# Patient Record
Sex: MALE | Race: WHITE | NOT HISPANIC OR LATINO | Employment: UNEMPLOYED
[De-identification: names, ages, dates, MRNs, and addresses within clinical notes are randomized per-mention and may not be internally consistent; named-entity substitution may affect disease eponyms.]

---

## 2020-02-05 ENCOUNTER — TRANSCRIBE ORDERS (OUTPATIENT)
Dept: OTHER | Age: 40
End: 2020-02-05

## 2020-02-05 DIAGNOSIS — G35 MULTIPLE SCLEROSIS (H): Primary | ICD-10-CM

## 2020-02-07 NOTE — TELEPHONE ENCOUNTER
RECORDS RECEIVED FROM: External   Date of Appt: 6/25/20   NOTES (FOR ALL VISITS) STATUS DETAILS   OFFICE NOTE from referring provider Care Everywhere Dr. Jelena Rick at [facility]:  2/7/20 telephone encounters  1/29/20     OFFICE NOTE from other specialist Care Everywhere Ting Bazan NP @ [facility]:  12/11/19    Dr Leija @ [facility]:  11/5/19 5/21/19    Dr Tellez @ [facility]:  9/26/17    Dr Gerardo Del Castillo @ [facility]:  7/18/16  4/7/15  10/22/14    Dr Abigail Gunn @ [facility]:  5/17/16   DISCHARGE SUMMARY from hospital N/A    DISCHARGE REPORT from the ER N/A    OPERATIVE REPORT N/A    MEDICATION LIST Care Everywhere    IMAGING  (FOR ALL VISITS)     EMG N/A    EEG N/A    MRI (HEAD, NECK, SPINE) Received [facility]:  MRI Cervical Spine 11/24/19  MRI Brain 11/24/19  MRI Thoracic Spine 5/21/19  MRI Cervical Spine 5/21/19    [facility]:  MRI Brain 5/14/19    [facility]:  MRI Cervical Spine 4/16/15  MRI Head 4/16/15  MRI Thoracic Spine 4/16/15   LUMBAR PUNCTURE N/A    [facility] Scan N/A    CT (HEAD, NECK, SPINE) Received [facility]:  CT Head 5/3/19      Action 2/7/20 MV 11.14am   Action Taken Imaging request faxed to [facility] ([facility]) for:  MRI Cervical Spine 11/24/19  MRI Brain 11/24/19  MRI Thoracic Spine 5/21/19  MRI Cervical Spine 5/21/19     Action 2/7/20 MV 11.14am   Action Taken Imaging request faxed to [facility] for:  MRI Cervical Spine 4/16/15  MRI Head 4/16/15  MRI Thoracic Spine 4/16/15     Action 2/7/20 MV 11.15am   Action Taken Imaging request faxed to [facility] for:  CT Head 5/3/19     Imaging Received  2/11/20 MV 10.36am  [facility]   Image Type (x): Disc:   PACS: x   Exam Date/Name MRI Cervical Spine 4/16/15  MRI Head 4/16/15  MRI Thoracic Spine 4/16/15  CT Head 5/3/19  MRI Cervical Spine 11/24/19  MRI Brain 11/24/19  MRI Thoracic Spine  5/21/19  MRI Cervical Spine 5/21/19 Comments: images resolved in PACS     Action 2/11/20 MV 10.37am   Action Taken Imaging request faxed to [facility] for:  MRI Brain 5/14/19     Imaging Received  2/20/20 MV 7.30am  [facility]   Image Type (x): Disc:   PACS: x   Exam Date/Name MRI Brain 5/14/19 Comments: images resolved in PACS

## 2020-06-25 ENCOUNTER — PRE VISIT (OUTPATIENT)
Dept: NEUROLOGY | Facility: CLINIC | Age: 40
End: 2020-06-25

## 2020-07-23 ENCOUNTER — VIRTUAL VISIT (OUTPATIENT)
Dept: NEUROLOGY | Facility: CLINIC | Age: 40
End: 2020-07-23
Attending: PSYCHIATRY & NEUROLOGY
Payer: COMMERCIAL

## 2020-07-23 DIAGNOSIS — G35 MULTIPLE SCLEROSIS (H): Primary | ICD-10-CM

## 2020-07-23 RX ORDER — IBUPROFEN 200 MG
200-400 TABLET ORAL EVERY 6 HOURS PRN
COMMUNITY

## 2020-07-23 RX ORDER — DALFAMPRIDINE 10 MG/1
10 TABLET, FILM COATED, EXTENDED RELEASE ORAL 2 TIMES DAILY
Qty: 60 TABLET | Refills: 11 | Status: SHIPPED | OUTPATIENT
Start: 2020-07-23 | End: 2021-06-23

## 2020-07-23 RX ORDER — AMITRIPTYLINE HYDROCHLORIDE 10 MG/1
10 TABLET ORAL DAILY
COMMUNITY
Start: 2020-07-17 | End: 2020-12-23

## 2020-07-23 RX ORDER — BACLOFEN 10 MG/1
TABLET ORAL DAILY
COMMUNITY
Start: 2020-03-13

## 2020-07-23 RX ORDER — AMANTADINE HYDROCHLORIDE 100 MG/1
100 CAPSULE, GELATIN COATED ORAL 2 TIMES DAILY
COMMUNITY
Start: 2019-05-21 | End: 2020-07-23

## 2020-07-23 RX ORDER — MODAFINIL 200 MG/1
TABLET ORAL DAILY
COMMUNITY
Start: 2020-04-24 | End: 2022-11-07

## 2020-07-23 RX ORDER — ALBUTEROL SULFATE 90 UG/1
AEROSOL, METERED RESPIRATORY (INHALATION) EVERY 4 HOURS PRN
COMMUNITY
Start: 2019-10-04

## 2020-07-23 RX ORDER — NORTRIPTYLINE HCL 25 MG
25 CAPSULE ORAL AT BEDTIME
Qty: 30 CAPSULE | Refills: 3 | Status: SHIPPED | OUTPATIENT
Start: 2020-07-23 | End: 2020-12-23

## 2020-07-23 RX ORDER — HYDROCODONE BITARTRATE AND ACETAMINOPHEN 5; 325 MG/1; MG/1
1-2 TABLET ORAL EVERY 8 HOURS PRN
COMMUNITY
Start: 2020-07-20

## 2020-07-23 RX ORDER — DIMETHYL FUMARATE 240 MG/1
240 CAPSULE ORAL 2 TIMES DAILY
COMMUNITY
Start: 2019-05-21 | End: 2020-10-27

## 2020-07-23 NOTE — LETTER
7/23/2020       RE: Jovan Cohen  [address]  [address]  [place] [address]     Dear Colleague,    Thank you for referring your patient, Jovan Cohen, to the [facility] MULTIPLE SCLEROSIS at [facility]. Please see a copy of my visit note below.    Jovan Cohen is a 39 year old male who is being evaluated via a billable video visit.        Video-Visit Details    Type of service:  Video Visit    Video Start Time: 1:35 PM  Video End Time: 3:00PM    Originating Location (pt. Location): Home    Distant Location (provider location):  [facility] MULTIPLE SCLEROSIS     Platform used for Video Visit: Closely    THE [facility] MULTIPLE SCL[facility]OSIS CLINIC  NEW PATIENT EVALUATION/CONSULTATION    Referral source:   [facility] [address] / [place] [address]            PRINCIPAL NEUROLOGIC DIAGNOSIS: Multiple Sclerosis    DISEASE SUMMARY  Date of onset: teenage years  Date of diagnosis of MS: 2002  Disease course at onset: Relapsing Remitting  Current disease course: Relapsing Remitting  Previous disease therapies: Copaxone, Gilenya  Current disease therapy: Tecfidera x 3 years or so  Most recent MRI brain: 2019  Most recent MRI cervical spine: 2019  CSF:         HISTORY OF ILLNESS:    An opinion on this 39 year old right handed genetic male  was requested by Dr. Lydia Rick for second opinion regarding MS and its management. The patient was accompanied by daughter.       He reports to have had a concussion a year ago with no LOC but post concussion fatigue and dizziness. CT scan was normal at that tome, last week he had another concussion and he feels like the first time.    Regarding his MS I asked him how have his disability changed from the time that his daughter was born 7 years ago and now, he is a single father and the caretaker of his daughter  Who is autistic. He remember using a power IPNetVoicer but he actually worked  "out in a machine and he is now able to walk independently but has balance issues and spasticity. He is able to walk throughout the grocery store holding on to the cart and tries to keep it under 10-15 minutes so he is able to walk without needing to rest, if he does more than that his legs get \"shaky\" or he feels like he needs to sit down and if he overdoes it he would not be able to move much the rest of the day. He did try Ampyra and thinks it helped but he lost his neurologist Dr. Del Castillo who took care of him for about 15 years, since diagnosis to 2 years ago. He also saw Dr Bonilla, Dr Agrawal and more recently Dr. Rick who felt he is stable from the MS stand point and not undergoing an exacerbation based on symptoms and MRI stability but thought he may have migraines.    Going back to Ampyra he stopped taking it about 2 years ago which he now realizes correlates with his ambulation worsening since then.     While we were on the interview he got up to open the door for his father who brought him some food and is able to walk slowly but with some fluidity.    It has been difficult for him to find a physician that he feels comfortable with after losing Dr. Del Castillo who moved to [place].    He has had migraines in the past but since the first concussion his headaches have been worse, he also complains of memory problems worse since the first concussion as well suggestive of post concussive syndrome. Since the most recent concussion last week he has had a severe headache and is taking hydrocodone and cannabis. He is somewhat slow to respond. He took hydrocodone this AM an cannabis yesterday. He used to play hockey and had 2 back surgeries so hence chronic back and neck pain.       Current Symptom  1. Worsening ambulation  2. Vision (difficulty focusing), pain with eye motion.  3. Diplopia mild, worse especially after strain (wears prism glasses)  4. Fatigue  5. Photophobia  6. Constant headache        PAST HISTORY:  History " reviewed. No pertinent past medical history.    History reviewed. No pertinent surgical history.           Current Outpatient Prescriptions:  Current Outpatient Medications   Medication     albuterol (PROAIR HFA/PROVENTIL HFA/VENTOLIN HFA) 108 (90 Base) MCG/ACT inhaler     amantadine (SYMMETREL) 100 MG capsule     amitriptyline (ELAVIL) 10 MG tablet     aspirin (ASA) 81 MG EC tablet     baclofen (LIORESAL) 10 MG tablet     cholecalciferol (VITAMIN D3) 125 mcg (5000 units) capsule     dimethyl fumarate 240 MG CPDR     HYDROcodone-acetaminophen (NORCO) 5-325 MG tablet     modafinil (PROVIGIL) 200 MG tablet     ibuprofen (ADVIL/MOTRIN) 200 MG tablet     No current facility-administered medications for this visit.           ALLERGIES       Allergies   Allergen Reactions     Latex Anaphylaxis     Probable agent thought to have caused  The reaction.        Fingolimod Other (See Comments) and Palpitations     Chest pain hx.            Social History    Social History     Socioeconomic History     Marital status:      Spouse name: Not on file     Number of children: Not on file     Years of education: Not on file     Highest education level: Not on file   Occupational History     Not on file   Social Needs     Financial resource strain: Not on file     Food insecurity     Worry: Not on file     Inability: Not on file     Transportation needs     Medical: Not on file     Non-medical: Not on file   Tobacco Use     Smoking status: Not on file   Substance and Sexual Activity     Alcohol use: Not on file     Drug use: Not on file     Sexual activity: Not on file   Lifestyle     Physical activity     Days per week: Not on file     Minutes per session: Not on file     Stress: Not on file   Relationships     Social connections     Talks on phone: Not on file     Gets together: Not on file     Attends Yazdanism service: Not on file     Active member of club or organization: Not on file     Attends meetings of clubs or  organizations: Not on file     Relationship status: Not on file     Intimate partner violence     Fear of current or ex partner: Not on file     Emotionally abused: Not on file     Physically abused: Not on file     Forced sexual activity: Not on file   Other Topics Concern     Not on file   Social History Narrative     Not on file         FAMILY HISTORY     History reviewed. No pertinent family history.      REVIEW OF SYSTEMS:    Comprehensive review of systems otherwise was negative, including constitutional, head and neck, cardiovascular, pulmonary, gastrointestinal, endocrine, urologic, reproductive, rheumatic, hematologic, immunologic, dermatologic, and psychiatric.    Nutritional concerns: None  Driving issues: None   Safety concerns regarding living situations and safety at home: None  Risk of falls: None  Pain: None    REVIEW OF IMAGING STUDIES:    I personally reviewed the following images:        Impression:    RRMS: possible in the SPMS, on Tecfidera, unclear if he is currently having disease activity on MRI since the last one is from 11-19 but clinically I don't feel he is in the  subacute phase of an exacerbation.    MRI brain from 11-19  shows significant volume loss and increase lesion number compared to 2005.    Fatigue and ambulation an issue, worse since he stopped Ampyra.  Cognition an issue, worse since concussion.  Agree he may have chronic Migraine but the post concussive syndrome is playing a role in current symptoms.       Plan:    Aleve 225 mg take 4 pills BID instead of Hydrocodone for headache.  Switch to Nortriptyline 25 mg po at bedtime (less side effects than elavil).  Refer to headache clinic for long term management  Re-start dalfamrpidine 10 hours apart (6 AM and 4 PM if possible or 10 hours apart) to avoid insomnia.  Repeat MRI of the brain B, C and T spine w/wo contrast before next visit in 1 month.  CBC, CMP and Vit d levels  Discharge amantadine (continue Modafinil 200 mg po  every day)  RTC in 1 month face to face visit to discuss DMT switch        Finally I will follow the patient up in 1 month(s) as long as the patient is doing well. I instructed the patient to call or mychart my office with any concerns or questions.    I spent 90 minutes in this visit, with >50% direct patient time spent counseling about prognosis, treatment options, and coordination of care.     My recommendations will be communicated back to the patient's physician(s) by mail.  Follow-up is expected to be with me.      Barbara Spain MD  Chief, Multiple Sclerosis Division  Department of Neurology  [facility] [place]

## 2020-07-23 NOTE — PROGRESS NOTES
"Jovan Cohen is a 39 year old male who is being evaluated via a billable video visit.      The patient has been notified of following:     \"This video visit will be conducted via a call between you and your physician/provider. We have found that certain health care needs can be provided without the need for an in-person physical exam.  This service lets us provide the care you need with a video conversation.  If a prescription is necessary we can send it directly to your pharmacy.  If lab work is needed we can place an order for that and you can then stop by our lab to have the test done at a later time.    Video visits are billed at different rates depending on your insurance coverage.  Please reach out to your insurance provider with any questions.    If during the course of the call the physician/provider feels a video visit is not appropriate, you will not be charged for this service.\"    Patient has given verbal consent for Video visit? Yes  How would you like to obtain your AVS? Mail a copy  If you are dropped from the video visit, the video invite should be resent to: Text to cell phone: [phone]  Will anyone else be joining your video visit? No        Video-Visit Details    Type of service:  Video Visit    Video Start Time: 1:35 PM  Video End Time: 3:00PM    Originating Location (pt. Location): Home    Distant Location (provider location):  [facility] MULTIPLE SCLEROSIS     Platform used for Video Visit: [employer]    THE [facility] MULTIPLE SCLEROSIS CLINIC  NEW PATIENT EVALUATION/CONSULTATION    Referral source:   [facility] [address] / [place] [address]            PRINCIPAL NEUROLOGIC DIAGNOSIS: Multiple Sclerosis    DISEASE SUMMARY  Date of onset: teenage years  Date of diagnosis of MS: 2002  Disease course at onset: Relapsing Remitting  Current disease course: Relapsing Remitting  Previous disease therapies: Copaxone, Gilenya  Current disease therapy: " "Tecfidera x 3 years or so  Most recent MRI brain: 2019  Most recent MRI cervical spine: 2019  CSF:         HISTORY OF ILLNESS:    An opinion on this 39 year old right handed genetic male  was requested by Dr. Lydia Rick for second opinion regarding MS and its management. The patient was accompanied by daughter.       He reports to have had a concussion a year ago with no LOC but post concussion fatigue and dizziness. CT scan was normal at that tome, last week he had another concussion and he feels like the first time.    Regarding his MS I asked him how have his disability changed from the time that his daughter was born 7 years ago and now, he is a single father and the caretaker of his daughter  Who is autistic. He remember using a power whelchair but he actually worked out in a machine and he is now able to walk independently but has balance issues and spasticity. He is able to walk throughout the grocery store holding on to the cart and tries to keep it under 10-15 minutes so he is able to walk without needing to rest, if he does more than that his legs get \"shaky\" or he feels like he needs to sit down and if he overdoes it he would not be able to move much the rest of the day. He did try Ampyra and thinks it helped but he lost his neurologist Dr. Del Castillo who took care of him for about 15 years, since diagnosis to 2 years ago. He also saw Dr Bonilla, Dr Agrawal and more recently Dr. Rick who felt he is stable from the MS stand point and not undergoing an exacerbation based on symptoms and MRI stability but thought he may have migraines.    Going back to Ampyra he stopped taking it about 2 years ago which he now realizes correlates with his ambulation worsening since then.     While we were on the interview he got up to open the door for his father who brought him some food and is able to walk slowly but with some fluidity.    It has been difficult for him to find a physician that he feels comfortable " with after losing Dr. Del Castillo who moved to [place].    He has had migraines in the past but since the first concussion his headaches have been worse, he also complains of memory problems worse since the first concussion as well suggestive of post concussive syndrome. Since the most recent concussion last week he has had a severe headache and is taking hydrocodone and cannabis. He is somewhat slow to respond. He took hydrocodone this AM an cannabis yesterday. He used to play hockey and had 2 back surgeries so hence chronic back and neck pain.       Current Symptom  1. Worsening ambulation  2. Vision (difficulty focusing), pain with eye motion.  3. Diplopia mild, worse especially after strain (wears prism glasses)  4. Fatigue  5. Photophobia  6. Constant headache        PAST HISTORY:  History reviewed. No pertinent past medical history.    History reviewed. No pertinent surgical history.           Current Outpatient Prescriptions:  Current Outpatient Medications   Medication     albuterol (PROAIR HFA/PROVENTIL HFA/VENTOLIN HFA) 108 (90 Base) MCG/ACT inhaler     amantadine (SYMMETREL) 100 MG capsule     amitriptyline (ELAVIL) 10 MG tablet     aspirin (ASA) 81 MG EC tablet     baclofen (LIORESAL) 10 MG tablet     cholecalciferol (VITAMIN D3) 125 mcg (5000 units) capsule     dimethyl fumarate 240 MG CPDR     HYDROcodone-acetaminophen (NORCO) 5-325 MG tablet     modafinil (PROVIGIL) 200 MG tablet     ibuprofen (ADVIL/MOTRIN) 200 MG tablet     No current facility-administered medications for this visit.           ALLERGIES       Allergies   Allergen Reactions     Latex Anaphylaxis     Probable agent thought to have caused  The reaction.        Fingolimod Other (See Comments) and Palpitations     Chest pain hx.            Social History    Social History     Socioeconomic History     Marital status:      Spouse name: Not on file     Number of children: Not on file     Years of education: Not on file     Highest  education level: Not on file   Occupational History     Not on file   Social Needs     Financial resource strain: Not on file     Food insecurity     Worry: Not on file     Inability: Not on file     Transportation needs     Medical: Not on file     Non-medical: Not on file   Tobacco Use     Smoking status: Not on file   Substance and Sexual Activity     Alcohol use: Not on file     Drug use: Not on file     Sexual activity: Not on file   Lifestyle     Physical activity     Days per week: Not on file     Minutes per session: Not on file     Stress: Not on file   Relationships     Social connections     Talks on phone: Not on file     Gets together: Not on file     Attends [employer] service: Not on file     Active member of club or organization: Not on file     Attends meetings of clubs or organizations: Not on file     Relationship status: Not on file     Intimate partner violence     Fear of current or ex partner: Not on file     Emotionally abused: Not on file     Physically abused: Not on file     Forced sexual activity: Not on file   Other Topics Concern     Not on file   Social History Narrative     Not on file         FAMILY HISTORY     History reviewed. No pertinent family history.      REVIEW OF SYSTEMS:    Comprehensive review of systems otherwise was negative, including constitutional, head and neck, cardiovascular, pulmonary, gastrointestinal, endocrine, urologic, reproductive, rheumatic, hematologic, immunologic, dermatologic, and psychiatric.    Nutritional concerns: None  Driving issues: None   Safety concerns regarding living situations and safety at home: None  Risk of falls: None  Pain: None    REVIEW OF IMAGING STUDIES:    I personally reviewed the following images:        Impression:    RRMS: possible in the SPMS, on Tecfidera, unclear if he is currently having disease activity on MRI since the last one is from 11-19 but clinically I don't feel he is in the  subacute phase of an  exacerbation.    MRI brain from 11-19  shows significant volume loss and increase lesion number compared to 2005.    Fatigue and ambulation an issue, worse since he stopped Ampyra.  Cognition an issue, worse since concussion.  Agree he may have chronic Migraine but the post concussive syndrome is playing a role in current symptoms.       Plan:    Aleve 225 mg take 4 pills BID instead of Hydrocodone for headache.  Switch to Nortriptyline 25 mg po at bedtime (less side effects than elavil).  Refer to headache clinic for long term management  Re-start dalfamrpidine 10 hours apart (6 AM and 4 PM if possible or 10 hours apart) to avoid insomnia.  Repeat MRI of the brain B, C and T spine w/wo contrast before next visit in 1 month.  CBC, CMP and Vit d levels  Discharge amantadine (continue Modafinil 200 mg po every day)  RTC in 1 month face to face visit to discuss DMT switch        Finally I will follow the patient up in 1 month(s) as long as the patient is doing well. I instructed the patient to call or mychart my office with any concerns or questions.    I spent 90 minutes in this visit, with >50% direct patient time spent counseling about prognosis, treatment options, and coordination of care.     My recommendations will be communicated back to the patient's physician(s) by mail.  Follow-up is expected to be with me.      Barbara Spain MD  Chief, Multiple Sclerosis Division  Department of Neurology  [facility] [place]

## 2020-07-23 NOTE — PATIENT INSTRUCTIONS
Impression:    RRMS: possible in the SPMS, on Tecfidera, unclear if he is currently having disease activity on MRI since the last one is from 11-19 but clinically I don't feel he is in the  subacute phase of an exacerbation.    MRI brain from 11-19  shows significant volume loss and increase lesion number compared to 2005.    Fatigue and ambulation an issue, worse since he stopped Ampyra.  Cognition an issue, worse since concussion.  Agree he may have chronic Migraine but the post concussive syndrome is playing a role in current symptoms.       Plan:  Aleve 225 mg take 4 pills BID instead of Hydrocodone for headache.  Switch to Nortriptyline 25 mg po at bedtime (less side effects than elavil).  Refer to headache clinic for long term management  Re-start dalfamrpidine 10 hours apart (6 AM and 4 PM if possible) to avoid insomnia.  Repeat MRI of the brain B, C and T spine w/wo contrast before next visit in 1 month.  CBC, CMP and Vit d levels  discharge amantadine (continue Modafinil 200 mg po every day  RTC in 1 month face to face visit to discuss DMT switch

## 2020-07-24 ENCOUNTER — TELEPHONE (OUTPATIENT)
Dept: NEUROLOGY | Facility: CLINIC | Age: 40
End: 2020-07-24

## 2020-07-24 NOTE — TELEPHONE ENCOUNTER
Prior Authorization Approval    Authorization Effective Date: 6/24/2020  Authorization Expiration Date: 7/24/2021  Medication: Dalfampridine ER 10MG Tablets (APPROVED)  Approved Dose/Quantity: 60/30 days  Reference #:     Insurance Company: [employer] - Phone [phone] Fax [phone]  Expected CoPay:       CoPay Card Available:      Foundation Assistance Needed:    Which Pharmacy is filling the prescription (Not needed for infusion/clinic administered): [facility] - [address] [place] [address]  Pharmacy Notified: Yes  Patient Notified: Yes

## 2020-10-15 ENCOUNTER — TELEPHONE (OUTPATIENT)
Dept: NEUROLOGY | Facility: CLINIC | Age: 40
End: 2020-10-15

## 2020-10-16 ENCOUNTER — TELEPHONE (OUTPATIENT)
Dept: NEUROLOGY | Facility: CLINIC | Age: 40
End: 2020-10-16

## 2020-10-16 NOTE — TELEPHONE ENCOUNTER
[facility] Call Center    Phone Message    May a detailed message be left on voicemail: yes     Reason for Call: Other: Patient calling in regards to MRI orders that were sent to [facility]. Pt states that they need another form from Dr. Spain in order to get MRI done. Pt states they faxed form back over to clinic.     Pt states he really wants to get this MRI done before appt with Dr. Spain on 10/21 and is hoping that this form can be filled out and faxed over there asap.    Please advise and call patient back to discuss further     Action Taken: Other:  MS    Travel Screening: Not Applicable

## 2020-10-16 NOTE — TELEPHONE ENCOUNTER
[facility] Call Center    Phone Message    May a detailed message be left on voicemail: yes     Reason for Call: Order(s): Other:   Reason for requested: MRI  Date needed: ASAP  Provider name: Dr. Grabiel Aldana calling to request his MRI be sent to [facility] in [place]. Please call Jovan to confirm.    Action Taken: Message routed to:  [facility]):  MS    Travel Screening: Not Applicable

## 2020-10-16 NOTE — TELEPHONE ENCOUNTER
Spoke with Jovan.  Told him MRI orders have been faxed to [facility] [place] (phone [phone], fax [phone]).  He plans to call and schedule today (10/16).      Pinky Stack RN

## 2020-10-19 NOTE — TELEPHONE ENCOUNTER
Spoke to Jovan and told him the MRI orders have been received.  Orders placed in Dr. Spain's folder for signature.  Jovan still plans to be at his appointment this Wednesday.    Pinky Stack RN

## 2020-10-20 NOTE — TELEPHONE ENCOUNTER
[facility]    Phone Message    May a detailed message be left on voicemail: yes     Reason for Call: Other: Jovan calling back. He states [facility] have not received MRI orders. Pt is upset. Please fax MRI orders as soon as possible to [phone].   Call pt when orders have been faxed.     Action Taken: Message routed to:  [facility] ([facility]):  neuro    Travel Screening: Not Applicable

## 2020-10-21 ENCOUNTER — MEDICAL CORRESPONDENCE (OUTPATIENT)
Dept: HEALTH INFORMATION MANAGEMENT | Facility: CLINIC | Age: 40
End: 2020-10-21

## 2020-10-21 ENCOUNTER — OFFICE VISIT (OUTPATIENT)
Dept: NEUROLOGY | Facility: CLINIC | Age: 40
End: 2020-10-21
Attending: PSYCHIATRY & NEUROLOGY
Payer: COMMERCIAL

## 2020-10-21 VITALS — DIASTOLIC BLOOD PRESSURE: 87 MMHG | HEART RATE: 82 BPM | SYSTOLIC BLOOD PRESSURE: 121 MMHG | WEIGHT: 178.2 LBS

## 2020-10-21 DIAGNOSIS — G35 MULTIPLE SCLEROSIS (H): Primary | ICD-10-CM

## 2020-10-21 PROCEDURE — G0463 HOSPITAL OUTPT CLINIC VISIT: HCPCS

## 2020-10-21 PROCEDURE — 99214 OFFICE O/P EST MOD 30 MIN: CPT | Mod: GC | Performed by: PSYCHIATRY & NEUROLOGY

## 2020-10-21 SDOH — HEALTH STABILITY: MENTAL HEALTH: HOW OFTEN DO YOU HAVE A DRINK CONTAINING ALCOHOL?: NOT ASKED

## 2020-10-21 SDOH — HEALTH STABILITY: MENTAL HEALTH: HOW OFTEN DO YOU HAVE 6 OR MORE DRINKS ON ONE OCCASION?: NOT ASKED

## 2020-10-21 SDOH — HEALTH STABILITY: MENTAL HEALTH: HOW MANY STANDARD DRINKS CONTAINING ALCOHOL DO YOU HAVE ON A TYPICAL DAY?: NOT ASKED

## 2020-10-21 ASSESSMENT — PAIN SCALES - GENERAL: PAINLEVEL: NO PAIN (0)

## 2020-10-21 NOTE — LETTER
10/21/2020       RE: Jovan Cohen  [address]  [address]  [facility] [place] [address]     Dear Colleague,    Thank you for referring your patient, Jovan Cohen, to the [facility] [place] at [facility]. Please see a copy of my visit note below.    [facility], [place]   Neurology Clinic Follow up Note  Jovan Cohen  [id number]  10/21/2020    HPI:   Last time he was seen as a virtual visit 7/23/202.    The recommendation from the last visit was as the followings   Aleve 225 mg take 4 pills BID instead of Hydrocodone for headache.  Switch to Nortriptyline 25 mg po at bedtime (less side effects than elavil).  Refer to headache clinic for long term management  Re-start dalfamrpidine 10 hours apart (6 AM and 4 PM if possible or 10 hours apart) to avoid insomnia.  Repeat MRI of the brain B, C and T spine w/wo contrast before next visit in 1 month.  CBC, CMP and Vit d levels  Discharge amantadine (continue Modafinil 200 mg po every day)  RTC in 1 month face to face visit to discuss DMT switch    Brief H&P  40 year old male patient with history of brain concussion, he was diagnosed 15 years ago with MS, in general he got really worse over years, he did not have any relapse for at least 5 years. Since then he continued to have unsteady walking and moving his eyes has been difficult and eye fatigue and eye pain and sort of double vision.     First relapse he had may be when he was 18 year old he said, he got what he described horrible pain in the finger tips and pain in the eyes that was increasing with movement and legs muscle cramps, that lasted for years (after that his symptoms continued to fluctuate). In 2002, he thought he had another relapse c/w vertical nystagmus and vertigo  And left side paralysis, the eye symptoms subsided gradually over couple years then finally he was diagnosed with MS around 2004. Started  on copaxone for 7 years, then other oral med that did not work (could not remember the name), then started tycfidera until a month ago because he ran out the prescription.     Interval History   He did not get the MRI, they need a special form signed by his doctor before they can schedule the MRI he said he was told. He did not see headache doctor (he did not set up his appointment), he gets that constant eye pain, he takes the amitriptyline , may be it does help but it causes dry mouth he said. He takes norco for chronic lumbar back pain. He is not taking aleve. When he started the dalfampridine, he think he can walk little better. The cannabis helps the urinary incontinence he said. He still gets muscle stiffness and fatigue, he takes baclofen for the muscle spasms which helps.       ROS:  A 10-point ROS was performed, negative except as per HPI.    PMH:  No past medical history on file.    PSH:  No past surgical history on file.    Allergies:  Allergies   Allergen Reactions     Latex Anaphylaxis     Probable agent thought to have caused  The reaction.        Fingolimod Other (See Comments) and Palpitations     Chest pain hx.          Medications:    Current Outpatient Medications:      albuterol (PROAIR HFA/PROVENTIL HFA/VENTOLIN HFA) 108 (90 Base) MCG/ACT inhaler, every 4 hours as needed, Disp: , Rfl:      amitriptyline (ELAVIL) 10 MG tablet, Take 10 mg by mouth daily, Disp: , Rfl:      aspirin (ASA) 81 MG EC tablet, Take 81 mg by mouth daily, Disp: , Rfl:      baclofen (LIORESAL) 10 MG tablet, daily, Disp: , Rfl:      cholecalciferol (VITAMIN D3) 125 mcg (5000 units) capsule, Take 5,000 Units by mouth every 3 days, Disp: , Rfl:      Dalfampridine 10 MG TB12, Take 1 tablet (10 mg) by mouth 2 times daily, Disp: 60 tablet, Rfl: 11     dimethyl fumarate 240 MG CPDR, Take 240 mg by mouth 2 times daily, Disp: , Rfl:      HYDROcodone-acetaminophen (NORCO) 5-325 MG tablet, Take 1-2 tablets by mouth every 8 hours as  needed, Disp: , Rfl:      ibuprofen (ADVIL/MOTRIN) 200 MG tablet, Take 200-400 mg by mouth every 6 hours as needed, Disp: , Rfl:      modafinil (PROVIGIL) 200 MG tablet, daily, Disp: , Rfl:      nortriptyline (PAMELOR) 25 MG capsule, Take 1 capsule (25 mg) by mouth At Bedtime, Disp: 30 capsule, Rfl: 3    Social History:  Social History     Tobacco Use     Smoking status: Not on file   Substance Use Topics     Alcohol use: Not on file       Family History:  No family history on file.      Objective   There were no vitals taken for this visit.  General: pt laying comfortably in bed, breathing easily on ra, in NAD   HEENT: no oral ulcers   Chest: cta   Heart: rrr  Abdomen: soft, nt, nd, +BS  Ext: no edema   Skin: no rashes  Neurological examination:  Mental status:  Patient is alert, attentive, and oriented x 3.  Language is coherent and fluent without dysarthria or aphasia.  Memory, comprehension and ability to follow commands were intact.        Cranial nerves: Pupils were round and reacted to light.  Extraocular movements were limited because of eye pain w movement. There was no face, jaw, palate or tongue weakness or atrophy. Hearing was grossly intact. Shrugging shoulder is normal    Motor exam: No atrophy or fasciculations.  No action or percussion myotonia or paramyotonia.  Manual muscle testing revealed the following MRC grade muscle power:    Right Left   Neck flexion 5     Neck extension 5     Shoulder ext rotation 5  5    Shoulder abduction 5 5   Elbow extension 5 5   Elbow flexion             5 5   Wrist extension 5 5   Wrist flexion 5 5   Finger extension 5 5   Finger flexion 5 5   FDI 5 5   APB 5 5   Hip extension 5 5    Hip flexion 5- 5 -   Knee extension 5 5   Knee flexion 5 5   Dorsiflexion 5 5   Plantarflexion 5 5   Eversion 5 5   Inversion 5 5   Ext Hallucis Longus 5 5      Complex motor skills revealed normal coordination.  Finger-nose- finger and heel to shin were intact.       Sensory exam  revealed vibration to be  >10/>10s at the toes, >20>20s at the fingers. PP decreased on the left hand   Pt was not able to walk on heels or toes and was able to do tandem without any difficulty.       Deep tendon reflexes:    Right Left   Triceps 2 2   Biceps 2 2   Brachioradialis 2 2   Knee jerk 3 3   Ankle jerk 3 3   Plantar responses were flexor bilaterally. 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     RRMS   Possible in the SPMS, on Tecfidera but he stopped it one month ago, unclear if he is currently having disease activity on MRI since the last one is from 11/2019 but clinically I don't feel he is in the  subacute phase of an exacerbation. MRI brain from 11-19  shows significant volume loss and increase lesion number compared to 2005.     Fatigue and ambulation an issue  Worse since he stopped Ampyra.    Cognition an issue and possible postconcussion syndrome and headache   worse since concussion. Amitriptyline was prescribed during his original visit in July and helped with headaches but its causing dry mouth so we will taper off.    Muscle spasms   He thinks baclofen and cannabis help      Brain Fogginess  He think modafinil helps     Eye pain and spasms during movement, no vision loss  We will refer to neurophthalmology for further evaluation     Recommendations:   1. Repeat MRI of the brain cervical and thoracic spine w/wo contrast at [facility], the day of next visit.  2. Re-start Tecfidera  3. Refer to neurophthalmology   4. Decrease Elavil to 5 mg po at bedtime x 1 month then stop  5. Continue Ampyra  6. Bring father to next appointment if possible  Patient was seen and discussed with attending neurologist, Dr. Grabiel Jacome MD  Neurology G4  [phone]    I saw and evaluated the patient with the resident and agree with the assessment and plan. I  was present for key portions of the above examination.    Barbara Spain MD  Chief, Multiple Sclerosis Division  Department of Neurology  [facility]      Again, thank you for allowing me to participate in the care of your patient.      Sincerely,    Barbara Spain MD

## 2020-10-21 NOTE — LETTER
10/21/2020       RE: Jovan Cohen  [address]  [address]  [place] [place] [facility] [address]     Dear Colleague,    Thank you for referring your patient, Jovan Cohen, to the [facility] [place] at [facility]. Please see a copy of my visit note below.    [facility], [place]   Neurology Clinic Follow up Note  Jovan Cohen  [id number]  10/21/2020    HPI:   Last time he was seen as a virtual visit 7/23/202.    The recommendation from the last visit was as the followings   Aleve 225 mg take 4 pills BID instead of Hydrocodone for headache.  Switch to Nortriptyline 25 mg po at bedtime (less side effects than elavil).  Refer to headache clinic for long term management  Re-start dalfamrpidine 10 hours apart (6 AM and 4 PM if possible or 10 hours apart) to avoid insomnia.  Repeat MRI of the brain B, C and T spine w/wo contrast before next visit in 1 month.  CBC, CMP and Vit d levels  Discharge amantadine (continue Modafinil 200 mg po every day)  RTC in 1 month face to face visit to discuss DMT switch    Brief H&P  40 year old male patient with history of brain concussion, he was diagnosed 15 years ago with MS, in general he got really worse over years, he did not have any relapse for at least 5 years. Since then he continued to have unsteady walking and moving his eyes has been difficult and eye fatigue and eye pain and sort of double vision.     First relapse he had may be when he was 18 year old he said, he got what he described horrible pain in the finger tips and pain in the eyes that was increasing with movement and legs muscle cramps, that lasted for years (after that his symptoms continued to fluctuate). In 2002, he thought he had another relapse c/w vertical nystagmus and vertigo  And left side paralysis, the eye symptoms subsided gradually over couple years then finally he was diagnosed with MS around 2004. Started  on copaxone for 7 years, then other oral med that did not work (could not remember the name), then started tycfidera until a month ago because he ran out the prescription.     Interval History   He did not get the MRI, they need a special form signed by his doctor before they can schedule the MRI he said he was told. He did not see headache doctor (he did not set up his appointment), he gets that constant eye pain, he takes the amitriptyline , may be it does help but it causes dry mouth he said. He takes norco for chronic lumbar back pain. He is not taking aleve. When he started the dalfampridine, he think he can walk little better. The cannabis helps the urinary incontinence he said. He still gets muscle stiffness and fatigue, he takes baclofen for the muscle spasms which helps.     ROS:  A 10-point ROS was performed, negative except as per HPI.    PMH:  No past medical history on file.    PSH:  No past surgical history on file.    Allergies:  Allergies   Allergen Reactions     Latex Anaphylaxis     Probable agent thought to have caused  The reaction.        Fingolimod Other (See Comments) and Palpitations     Chest pain hx.        Medications:  Current Outpatient Medications:      albuterol (PROAIR HFA/PROVENTIL HFA/VENTOLIN HFA) 108 (90 Base) MCG/ACT inhaler, every 4 hours as needed, Disp: , Rfl:      amitriptyline (ELAVIL) 10 MG tablet, Take 10 mg by mouth daily, Disp: , Rfl:      aspirin (ASA) 81 MG EC tablet, Take 81 mg by mouth daily, Disp: , Rfl:      baclofen (LIORESAL) 10 MG tablet, daily, Disp: , Rfl:      cholecalciferol (VITAMIN D3) 125 mcg (5000 units) capsule, Take 5,000 Units by mouth every 3 days, Disp: , Rfl:      Dalfampridine 10 MG TB12, Take 1 tablet (10 mg) by mouth 2 times daily, Disp: 60 tablet, Rfl: 11     dimethyl fumarate 240 MG CPDR, Take 240 mg by mouth 2 times daily, Disp: , Rfl:      HYDROcodone-acetaminophen (NORCO) 5-325 MG tablet, Take 1-2 tablets by mouth every 8 hours as needed,  Disp: , Rfl:      ibuprofen (ADVIL/MOTRIN) 200 MG tablet, Take 200-400 mg by mouth every 6 hours as needed, Disp: , Rfl:      modafinil (PROVIGIL) 200 MG tablet, daily, Disp: , Rfl:      nortriptyline (PAMELOR) 25 MG capsule, Take 1 capsule (25 mg) by mouth At Bedtime, Disp: 30 capsule, Rfl: 3      Social History:  Social History     Tobacco Use     Smoking status: Not on file   Substance Use Topics     Alcohol use: Not on file     Family History:  No family history on file.    Objective   There were no vitals taken for this visit.  General: pt laying comfortably in bed, breathing easily on ra, in NAD   HEENT: no oral ulcers   Chest: cta   Heart: rrr  Abdomen: soft, nt, nd, +BS  Ext: no edema   Skin: no rashes  Neurological examination:  Mental status:  Patient is alert, attentive, and oriented x 3.  Language is coherent and fluent without dysarthria or aphasia.  Memory, comprehension and ability to follow commands were intact.        Cranial nerves: Pupils were round and reacted to light.  Extraocular movements were limited because of eye pain w movement. There was no face, jaw, palate or tongue weakness or atrophy. Hearing was grossly intact. Shrugging shoulder is normal    Motor exam: No atrophy or fasciculations.  No action or percussion myotonia or paramyotonia.  Manual muscle testing revealed the following MRC grade muscle power:    Right Left   Neck flexion 5     Neck extension 5     Shoulder ext rotation 5  5    Shoulder abduction 5 5   Elbow extension 5 5   Elbow flexion             5 5   Wrist extension 5 5   Wrist flexion 5 5   Finger extension 5 5   Finger flexion 5 5   FDI 5 5   APB 5 5   Hip extension 5 5    Hip flexion 5- 5 -   Knee extension 5 5   Knee flexion 5 5   Dorsiflexion 5 5   Plantarflexion 5 5   Eversion 5 5   Inversion 5 5   Ext Hallucis Longus 5 5      Complex motor skills revealed normal coordination.  Finger-nose- finger and heel to shin were intact.       Sensory exam revealed  vibration to be  >10/>10s at the toes, >20>20s at the fingers. PP decreased on the left hand   Pt was not able to walk on heels or toes and was able to do tandem without any difficulty.       Deep tendon reflexes:    Right Left   Triceps 2 2   Biceps 2 2   Brachioradialis 2 2   Knee jerk 3 3   Ankle jerk 3 3   Plantar responses were flexor bilaterally. 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     RRMS   Possible in the SPMS, on Tecfidera but he stopped it one month ago, unclear if he is currently having disease activity on MRI since the last one is from 11/2019 but clinically I don't feel he is in the  subacute phase of an exacerbation. MRI brain from 11-19  shows significant volume loss and increase lesion number compared to 2005.     Fatigue and ambulation an issue  Worse since he stopped Ampyra.    Cognition an issue and possible postconcussion syndrome and headache   worse since concussion. Amitriptyline was prescribed during his original visit in July and helped with headaches but its causing dry mouth so we will taper off.    Muscle spasms   He thinks baclofen and cannabis help      Brain Fogginess  He think modafinil helps     Eye pain and spasms during movement, no vision loss  We will refer to neurophthalmology for further evaluation     Recommendations:   1. Repeat MRI of the brain cervical and thoracic spine w/wo contrast at [facility] [place], the day of next visit.  2. Re-start Tecfidera  3. Refer to neurophthalmology   4. Decrease Elavil to 5 mg po at bedtime x 1 month then stop  5. Continue Ampyra  6. Bring father to next appointment if possible  Patient was seen and discussed with attending neurologist, Dr. Grabiel Jacome MD  Neurology G4  [phone]    I saw and evaluated the patient with the resident and agree with the assessment and plan. I was present  for key portions of the above examination.    Again, thank you for allowing me to participate in the care of your patient.  Sincerely,    Barbara Spain MD  Chief, Multiple Sclerosis Division  Department of Neurology  [facility] [place]

## 2020-10-21 NOTE — PATIENT INSTRUCTIONS
Impression:    1 MS clinically stable on Tecfidera but ran out of it a month ago.    2 Amitriptyline was prescribed during his original visit in July and helped with headaches but its causing dry mouth so we will taper off.    3 He continues to have pain during eye movement, chronic and without vision loss.    4 He feels like Ampyra helped with MS symptoms of imbalance and helped his ambulation     Plan:    1 Re-start Tecfidera. Repeat MRI of the brain cervical and thoracic spine w/wo contrast at [facility] [place], the day of next visit.    2 Refer to neurophthalmology     3 Decrease Elavil to 5 mg po at bedtime x 1 month then stop.    4 Continue Ampyra

## 2020-10-21 NOTE — PROGRESS NOTES
[facility], [place]   Neurology Clinic Follow up Note  Jovan Cohen  [id number]  10/21/2020    HPI:   Last time he was seen as a virtual visit 7/23/202.    The recommendation from the last visit was as the followings   Aleve 225 mg take 4 pills BID instead of Hydrocodone for headache.  Switch to Nortriptyline 25 mg po at bedtime (less side effects than elavil).  Refer to headache clinic for long term management  Re-start dalfamrpidine 10 hours apart (6 AM and 4 PM if possible or 10 hours apart) to avoid insomnia.  Repeat MRI of the brain B, C and T spine w/wo contrast before next visit in 1 month.  CBC, CMP and Vit d levels  Discharge amantadine (continue Modafinil 200 mg po every day)  RTC in 1 month face to face visit to discuss DMT switch    Brief H&P  40 year old male patient with history of brain concussion, he was diagnosed 15 years ago with MS, in general he got really worse over years, he did not have any relapse for at least 5 years. Since then he continued to have unsteady walking and moving his eyes has been difficult and eye fatigue and eye pain and sort of double vision.     First relapse he had may be when he was 18 year old he said, he got what he described horrible pain in the finger tips and pain in the eyes that was increasing with movement and legs muscle cramps, that lasted for years (after that his symptoms continued to fluctuate). In 2002, he thought he had another relapse c/w vertical nystagmus and vertigo  And left side paralysis, the eye symptoms subsided gradually over couple years then finally he was diagnosed with MS around 2004. Started on copaxone for 7 years, then other oral med that did not work (could not remember the name), then started tycfidera until a month ago because he ran out the prescription.     Interval History   He did not get the MRI, they need a special form signed by his doctor before they can schedule the MRI he said he was told. He  did not see headache doctor (he did not set up his appointment), he gets that constant eye pain, he takes the amitriptyline , may be it does help but it causes dry mouth he said. He takes norco for chronic lumbar back pain. He is not taking aleve. When he started the dalfampridine, he think he can walk little better. The cannabis helps the urinary incontinence he said. He still gets muscle stiffness and fatigue, he takes baclofen for the muscle spasms which helps.       ROS:  A 10-point ROS was performed, negative except as per HPI.    PMH:  No past medical history on file.    PSH:  No past surgical history on file.    Allergies:  Allergies   Allergen Reactions     Latex Anaphylaxis     Probable agent thought to have caused  The reaction.        Fingolimod Other (See Comments) and Palpitations     Chest pain hx.          Medications:    Current Outpatient Medications:      albuterol (PROAIR HFA/PROVENTIL HFA/VENTOLIN HFA) 108 (90 Base) MCG/ACT inhaler, every 4 hours as needed, Disp: , Rfl:      amitriptyline (ELAVIL) 10 MG tablet, Take 10 mg by mouth daily, Disp: , Rfl:      aspirin (ASA) 81 MG EC tablet, Take 81 mg by mouth daily, Disp: , Rfl:      baclofen (LIORESAL) 10 MG tablet, daily, Disp: , Rfl:      cholecalciferol (VITAMIN D3) 125 mcg (5000 units) capsule, Take 5,000 Units by mouth every 3 days, Disp: , Rfl:      Dalfampridine 10 MG TB12, Take 1 tablet (10 mg) by mouth 2 times daily, Disp: 60 tablet, Rfl: 11     dimethyl fumarate 240 MG CPDR, Take 240 mg by mouth 2 times daily, Disp: , Rfl:      HYDROcodone-acetaminophen (NORCO) 5-325 MG tablet, Take 1-2 tablets by mouth every 8 hours as needed, Disp: , Rfl:      ibuprofen (ADVIL/MOTRIN) 200 MG tablet, Take 200-400 mg by mouth every 6 hours as needed, Disp: , Rfl:      modafinil (PROVIGIL) 200 MG tablet, daily, Disp: , Rfl:      nortriptyline (PAMELOR) 25 MG capsule, Take 1 capsule (25 mg) by mouth At Bedtime, Disp: 30 capsule, Rfl: 3    Social  History:  Social History     Tobacco Use     Smoking status: Not on file   Substance Use Topics     Alcohol use: Not on file       Family History:  No family history on file.      Objective   There were no vitals taken for this visit.  General: pt laying comfortably in bed, breathing easily on ra, in NAD   HEENT: no oral ulcers   Chest: cta   Heart: rrr  Abdomen: soft, nt, nd, +BS  Ext: no edema   Skin: no rashes  Neurological examination:  Mental status:  Patient is alert, attentive, and oriented x 3.  Language is coherent and fluent without dysarthria or aphasia.  Memory, comprehension and ability to follow commands were intact.        Cranial nerves: Pupils were round and reacted to light.  Extraocular movements were limited because of eye pain w movement. There was no face, jaw, palate or tongue weakness or atrophy. Hearing was grossly intact. Shrugging shoulder is normal    Motor exam: No atrophy or fasciculations.  No action or percussion myotonia or paramyotonia.  Manual muscle testing revealed the following MRC grade muscle power:    Right Left   Neck flexion 5     Neck extension 5     Shoulder ext rotation 5  5    Shoulder abduction 5 5   Elbow extension 5 5   Elbow flexion             5 5   Wrist extension 5 5   Wrist flexion 5 5   Finger extension 5 5   Finger flexion 5 5   FDI 5 5   APB 5 5   Hip extension 5 5    Hip flexion 5- 5 -   Knee extension 5 5   Knee flexion 5 5   Dorsiflexion 5 5   Plantarflexion 5 5   Eversion 5 5   Inversion 5 5   Ext Hallucis Longus 5 5      Complex motor skills revealed normal coordination.  Finger-nose- finger and heel to shin were intact.       Sensory exam revealed vibration to be  >10/>10s at the toes, >20>20s at the fingers. PP decreased on the left hand   Pt was not able to walk on heels or toes and was able to do tandem without any difficulty.       Deep tendon reflexes:    Right Left   Triceps 2 2   Biceps 2 2   Brachioradialis 2 2   Knee jerk 3 3   Ankle jerk 3 3    Plantar responses were flexor bilaterally. 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     RRMS   Possible in the SPMS, on Tecfidera but he stopped it one month ago, unclear if he is currently having disease activity on MRI since the last one is from 11/2019 but clinically I don't feel he is in the  subacute phase of an exacerbation. MRI brain from 11-19  shows significant volume loss and increase lesion number compared to 2005.     Fatigue and ambulation an issue  Worse since he stopped Ampyra.    Cognition an issue and possible postconcussion syndrome and headache   worse since concussion. Amitriptyline was prescribed during his original visit in July and helped with headaches but its causing dry mouth so we will taper off.    Muscle spasms   He thinks baclofen and cannabis help      Brain Fogginess  He think modafinil helps     Eye pain and spasms during movement, no vision loss  We will refer to neurophthalmology for further evaluation     Recommendations:   1. Repeat MRI of the brain cervical and thoracic spine w/wo contrast at [facility], the day of next visit.  2. Re-start Tecfidera  3. Refer to neurophthalmology   4. Decrease Elavil to 5 mg po at bedtime x 1 month then stop  5. Continue Ampyra  6. Bring father to next appointment if possible  Patient was seen and discussed with attending neurologist, Dr. rGabiel Jacome MD  Neurology G4  [phone]    I saw and evaluated the patient with the resident and agree with the assessment and plan. I was present for key portions of the above examination.    Barbara Spain MD  Chief, Multiple Sclerosis Division  Department of Neurology  [facility] [place]

## 2020-10-22 ENCOUNTER — TELEPHONE (OUTPATIENT)
Dept: NEUROLOGY | Facility: CLINIC | Age: 40
End: 2020-10-22

## 2020-10-22 DIAGNOSIS — G35 MULTIPLE SCLEROSIS (H): Primary | ICD-10-CM

## 2020-10-22 NOTE — TELEPHONE ENCOUNTER
Prior Authorization Approval    Authorization Effective Date: 10/22/2020  Authorization Expiration Date: 2/20/2021  Medication: Tecfidera 240MG Capsules (PA on File)  Approved Dose/Quantity: 60  Reference #:     Insurance Company: [employer] - Phone [phone] Fax [phone]  Expected CoPay:    $0   CoPay Card Available: No    Foundation Assistance Needed:    Which Pharmacy is filling the prescription (Not needed for infusion/clinic administered): [address]  Pharmacy Notified: Yes  Patient Notified: Yes      Start form faxed to [facility] for review.He is restricted to [facility].

## 2020-10-22 NOTE — TELEPHONE ENCOUNTER
Prior Authorization Specialty Medication Request    Medication/Dose: Tecfidera 240mg  ICD code (if different than what is on RX):  Relapsing Remitting Multiple Sclerosis, G35  Previously Tried and Failed:  Copaxone, [place]     Important Lab Values: n/a  Rationale: Re-starting disease modifying therapy for demyelinating disease, please approve.    Insurance Name: [employer] [facility]  Insurance ID: [id number]  Insurance Phone Number: [phone]    Pharmacy Information (if different than what is on RX)  Name:  n/a  Phone:  n/a

## 2020-10-22 NOTE — TELEPHONE ENCOUNTER
[facility] Call Center    Phone Message    May a detailed message be left on voicemail: yes     Reason for Call: Appointment Intake    Referring Provider Name: Barbara Spain MD  Diagnosis and/or Symptoms: Hx of ON with persistent chronic pain    Writer contacted patient to schedule in neuro ophthalmology department from referral Dr. Spain placed. Patient expressed that this appointment would not be beneficial as he will do damage to his eyes by moving them around in the exam and he does not tolerate eye testing. He would like a call back to discuss if it is ok to not have the eye exam or if there is an alternative option.    Action Taken: Message routed to:  [facility]): [facility] NEUROLOGY [facility]ULT [facility] [[id number]]    Travel Screening: Not Applicable

## 2020-10-27 ENCOUNTER — TELEPHONE (OUTPATIENT)
Dept: NEUROLOGY | Facility: CLINIC | Age: 40
End: 2020-10-27

## 2020-10-27 NOTE — TELEPHONE ENCOUNTER
[facility] Call Center    Phone Message    May a detailed message be left on voicemail: yes     Reason for Call: Other: Dariana calling to request clarification on  TECFIDERA 120 MG - 240 MG. She states it was prescribed on 10/21. (Rx is not showing on medication list). Please call her back to discuss.     Action Taken: Message routed to:  [facility] ([facility]):  neuro     Travel Screening: Not Applicable

## 2020-10-27 NOTE — TELEPHONE ENCOUNTER
Tecfidera prescription pended to Dr. Spain for signature.    Miroslava Crowder, MS RN Care Coordinator

## 2020-10-27 NOTE — TELEPHONE ENCOUNTER
[facility] Call Center    Phone Message    May a detailed message be left on voicemail: no     Reason for Call: Other: Tori calling to request clarification on the prescription. She is wondering if the prescription is for the name brand or generic version. Please call her at your earliest convenience to discuss.     Action Taken: Message routed to:  [facility]  MS    Travel Screening: Not Applicable

## 2020-10-28 RX ORDER — DIMETHYL FUMARATE 240 MG/1
240 CAPSULE ORAL 2 TIMES DAILY
Qty: 60 CAPSULE | Refills: 11 | Status: SHIPPED | OUTPATIENT
Start: 2020-10-28 | End: 2021-10-06

## 2020-10-28 NOTE — TELEPHONE ENCOUNTER
Called Jovan and explained that there isn't an alternative to an eye exam and if Jovan wants to hold off on scheduling one that is fine per Dr. Spain.  No further needs at this time.    Pinky Stack RN

## 2020-10-28 NOTE — TELEPHONE ENCOUNTER
Rx will be signed by Dr. Spain, which is already pended to her; Brand or generic are okay.    Miroslava Crowder, MS RN Care Coordinator

## 2020-10-29 ENCOUNTER — TELEPHONE (OUTPATIENT)
Dept: NEUROLOGY | Facility: CLINIC | Age: 40
End: 2020-10-29

## 2020-10-29 NOTE — TELEPHONE ENCOUNTER
[facility] Call Center    Phone Message    May a detailed message be left on voicemail: yes     Reason for Call: Medication Refill Request    Has the patient contacted the pharmacy for the refill? Yes   Name of medication being requested: TECFIDERA   Provider who prescribed the medication: Dr. Spain  Pharmacy: [address]  Date medication is needed: asap     Patient calling to get medication refill - states that the pharmacy is needing more information from Dr. Spain         Action Taken: Other:  MS    Travel Screening: Not Applicable

## 2020-10-30 NOTE — TELEPHONE ENCOUNTER
[facility] Call Center    Phone Message    May a detailed message be left on voicemail: yes     Reason for Call: Medication Question or concern regarding medication   Prescription Clarification  Name of Medication:   TECFIDERA 120 MG - 240 MG  Prescribing Provider: Dr. Spain   Pharmacy: [address]   What on the order needs clarification? Dariana calling from pharmacy to see if patient is getting generic or brand name.    Please advise       Action Taken: Other:  MS    Travel Screening: Not Applicable

## 2020-11-02 NOTE — TELEPHONE ENCOUNTER
Spoke with [facility] and explained that either generic or brand is fine to dispense.  [facility] is dispensing generic version.    Pinky Stack RN

## 2020-12-23 ENCOUNTER — OFFICE VISIT (OUTPATIENT)
Dept: NEUROLOGY | Facility: CLINIC | Age: 40
End: 2020-12-23
Attending: PSYCHIATRY & NEUROLOGY
Payer: COMMERCIAL

## 2020-12-23 ENCOUNTER — ANCILLARY PROCEDURE (OUTPATIENT)
Dept: MRI IMAGING | Facility: CLINIC | Age: 40
End: 2020-12-23
Attending: PSYCHIATRY & NEUROLOGY
Payer: COMMERCIAL

## 2020-12-23 VITALS
HEART RATE: 71 BPM | SYSTOLIC BLOOD PRESSURE: 120 MMHG | DIASTOLIC BLOOD PRESSURE: 73 MMHG | RESPIRATION RATE: 16 BRPM | OXYGEN SATURATION: 97 % | BODY MASS INDEX: 25.73 KG/M2 | WEIGHT: 190 LBS | HEIGHT: 72 IN

## 2020-12-23 DIAGNOSIS — M54.2 NECK PAIN: ICD-10-CM

## 2020-12-23 DIAGNOSIS — G35 MULTIPLE SCLEROSIS (H): ICD-10-CM

## 2020-12-23 DIAGNOSIS — G35 MULTIPLE SCLEROSIS (H): Primary | ICD-10-CM

## 2020-12-23 PROCEDURE — A9585 GADOBUTROL INJECTION: HCPCS | Performed by: RADIOLOGY

## 2020-12-23 PROCEDURE — 72157 MRI CHEST SPINE W/O & W/DYE: CPT | Mod: GC | Performed by: RADIOLOGY

## 2020-12-23 PROCEDURE — 99214 OFFICE O/P EST MOD 30 MIN: CPT | Performed by: PSYCHIATRY & NEUROLOGY

## 2020-12-23 PROCEDURE — 70553 MRI BRAIN STEM W/O & W/DYE: CPT | Mod: GC | Performed by: RADIOLOGY

## 2020-12-23 PROCEDURE — 72156 MRI NECK SPINE W/O & W/DYE: CPT | Mod: GC | Performed by: RADIOLOGY

## 2020-12-23 PROCEDURE — G0463 HOSPITAL OUTPT CLINIC VISIT: HCPCS

## 2020-12-23 RX ORDER — GADOBUTROL 604.72 MG/ML
7.5 INJECTION INTRAVENOUS ONCE
Status: COMPLETED | OUTPATIENT
Start: 2020-12-23 | End: 2020-12-23

## 2020-12-23 RX ORDER — NORTRIPTYLINE HCL 10 MG
10 CAPSULE ORAL AT BEDTIME
Qty: 30 CAPSULE | Refills: 4 | Status: SHIPPED | OUTPATIENT
Start: 2020-12-23 | End: 2021-06-01

## 2020-12-23 RX ADMIN — GADOBUTROL 7.5 ML: 604.72 INJECTION INTRAVENOUS at 08:12

## 2020-12-23 ASSESSMENT — PAIN SCALES - GENERAL: PAINLEVEL: MODERATE PAIN (5)

## 2020-12-23 ASSESSMENT — MIFFLIN-ST. JEOR: SCORE: 1814.83

## 2020-12-23 NOTE — NURSING NOTE
Chief Complaint   Patient presents with     RECHECK     [facility] RETURN MULTIPLE SCLEROSIS - 8 wk f/u     Gage Vaughn

## 2020-12-23 NOTE — LETTER
12/23/2020       RE: Jovan Cohen  [address]  [address]  [place] [address]     Dear Colleague,    Thank you for referring your patient, Jovan Cohen, to the [facility] MULTIPLE SCLEROSIS CLINIC [place] [facility]. Please see a copy of my visit note below.    THE [facility] [place] MULTIPLE SCLEROSIS CLINIC  FOLLOW UP VISIT       PRINCIPAL NEUROLOGIC DIAGNOSIS: Multiple Sclerosis      HISTORY OF ILLNESS:    This is a follow visit for this 40 year old right handed genetic male  With a history of MS MS. Who was last seen on  .   At that time the patient was recommended to:    1. Repeat [place] of the brain cervical and thoracic spine w[place]wo [place] [facility] [place] next visit.  2. Re-start Tecfidera  3. Refer to neurophthalmology   4. Decrease Elavil to 5 mg po at bedtime x 1 month then stop  5. Continue Ampyra  6. Bring father to next appointment if possible     Since last visit he re-started Tecfidera and continues with Ampyra BID, which he thinks is hard to know if it helps. He stopped the elavil, and did not start Nortriptilyne, there was some confusion on the change but he reports sleeping well, he did not see neurophthalmology because he feels like his symptoms of eye pain which is chronic worsen after eye exams. His father states he suffers from eye pain for about a month after those exams. He takes Provigil 100 mg po BID and it helps with fatigue.    Current Symptoms:  1. Numbness in L arm off and on  2. fatigue  3. Neck pain     He underwent MRIs of the brain cervical and thoracic spine as recommended which will be discussed in a further section.  His daughter and father are with him during the visit as I previously requested.  His daughter is autistic and he has full custody of her but his father helps in many ways.    Current Outpatient Prescriptions:  Current Outpatient Medications   Medication     albuterol (PROAIR HFA/PROVENTIL  HFA/VENTOLIN HFA) 108 (90 Base) MCG/ACT inhaler     aspirin (ASA) 81 MG EC tablet     baclofen (LIORESAL) 10 MG tablet     cholecalciferol (VITAMIN D3) 125 mcg (5000 units) capsule     Dalfampridine 10 MG TB12     dimethyl fumarate 240 MG CPDR     Docusate Sodium (COLACE PO)     HYDROcodone-acetaminophen (NORCO) 5-325 MG tablet     ibuprofen (ADVIL/MOTRIN) 200 MG tablet     modafinil (PROVIGIL) 200 MG tablet     amitriptyline (ELAVIL) 10 MG tablet     nortriptyline (PAMELOR) 25 MG capsule     WIXELA INHUB 250-50 MCG/DOSE inhaler     No current facility-administered medications for this visit.         ALLERGIES    Allergies   Allergen Reactions     Latex Anaphylaxis     Probable agent thought to have caused  The reaction.        Fingolimod Other (See Comments) and Palpitations     Chest pain hx.          REVIEW OF SYSTEMS:    Comprehensive review of systems otherwise was negative, including constitutional, head and neck, cardiovascular, pulmonary, gastrointestinal, endocrine, urologic, reproductive, rheumatic, hematologic, immunologic, dermatologic, and psychiatric.    Nutritional concerns: None  Driving issues: None   Safety concerns regarding living situations and safety at home: None  Risk of falls: None  Pain: None    PHYSICAL EXAM:    Hair, skin, nails, and joints were normal. Neck was supple without Lhermitte's phenomenon.  There was no percussion tenderness over the spine.     Mental status:  Patient is alert, attentive, and oriented x 3.  Language is coherent and fluent without dysarthria or aphasia.  Memory, comprehension and ability to follow commands were intact.        Cranial nerves: Pupils were round and reacted to light.  Extraocular movements were limited because of eye pain w movement. There was no face, jaw, palate or tongue weakness or atrophy. Hearing was grossly intact. Shrugging shoulder is normal     Motor exam: No atrophy or fasciculations.  No action or percussion myotonia or paramyotonia.   Manual muscle testing revealed the following MRC grade muscle power:    Right Left   Neck flexion 5     Neck extension 5     Shoulder ext rotation 5  5    Shoulder abduction 5 5   Elbow extension 5 5   Elbow flexion             5 5   Wrist extension 5 5   Wrist flexion 5 5   Finger extension 5 5   Finger flexion 5 5   FDI 5 5   APB 5 5   Hip extension 5 5    Hip flexion 5- 5 -   Knee extension 5 5   Knee flexion 5 5   Dorsiflexion 5 5   Plantarflexion 5 5   Eversion 5 5   Inversion 5 5   Ext Hallucis Longus 5 5      Complex motor skills revealed normal coordination.  Finger-nose- finger and heel to shin were intact.       Sensory exam revealed vibration to be  >10/>10s at the toes, >20>20s at the fingers. PP decreased on the left hand   Pt was not able to walk on heels or toes and was able to do tandem without any difficulty.       Deep tendon reflexes:    Right Left   Triceps 2 2   Biceps 2 2   Brachioradialis 2 2   Knee jerk 3 3   Ankle jerk 3 3   Plantar responses were flexor bilaterally. Girard's negative b/l       REVIEW OF IMAGING STUDIES:    I personally reviewed the following images:    MRI of the brain from December 23, 2020 was compared with the one from November 2019 from an outside facility.  Lesion load is unchanged except for one new lesion currently not enhancing.     Cervical spinal cord shows at least 2 lesions at C1-2 and T1 significantly changed compared to 2019.  DJD is likely causing the neck pain especially due to significant C5-C6 left neural foraminal stenosis which could potentially cause the left upper extremity pain he complains about.  MRI of the thoracic spine shows no evidence of demyelinating lesions.    ASSESSMENT:    Relapsing remitting multiple sclerosis currently clinically and radiologically with stable, recently restarted on Tecfidera which helped for a couple of months due to insurance issues.  Neck pain likely related to degenerative joint disease of the cervical spine with  C5-C6 neuroforaminal stenosis on the left side.    PLAN:    PT for the neck pain  Re-start Noptriptylline 10 mg po at bedtime to improve neuropathic pain  Repeat blood work in 6 months CBC and LFTs to look for occult toxicity to Tecfidera.    Continue current management    Finally I will follow the patient up in 6 month(s) as long as the patient is doing well. I instructed the patient to call or mychart my office with any concerns or questions.    I spent 30 minutes in this visit, with >50% direct patient time spent counseling about prognosis, treatment options, and coordination of care.     My recommendations will be communicated back to the patient's physician(s) by mail.  Follow-up is expected to be with me.    Barbara Spain MD  Chief, Multiple Sclerosis Division  Department of Neurology  [facility] [place]

## 2020-12-23 NOTE — PROGRESS NOTES
THE [facility] MULTIPLE SCLEROSIS CLINIC  FOLLOW UP VISIT           PRINCIPAL NEUROLOGIC DIAGNOSIS: Multiple Sclerosis      HISTORY OF ILLNESS:    This is a follow visit for this 40 year old right handed genetic male  With a history of MS MS. Who was last seen on  .   At that time the patient was recommended to:    1. Repeat MRI of the brain cervical and thoracic spine w[place] [facility] next visit.  2. Re-start Tecfidera  3. Refer to neurophthalmology   4. Decrease Elavil to 5 mg po at bedtime x 1 month then stop  5. Continue Ampyra  6. Bring father to next appointment if possible     Since last visit he re-started Tecfidera and continues with Ampyra BID, which he thinks is hard to know if it helps. He stopped the elavil, and did not start Nortriptilyne, there was some confusion on the change but he reports sleeping well, he did not see neurophthalmology because he feels like his symptoms of eye pain which is chronic worsen after eye exams. His father states he suffers from eye pain for about a month after those exams. He takes Provigil 100 mg po BID and it helps with fatigue.    Current Symptoms:  1. Numbness in L arm off and on  2. fatigue  3. Neck pain     He underwent MRIs of the brain cervical and thoracic spine as recommended which will be discussed in a further section.  His daughter and father are with him during the visit as I previously requested.  His daughter is autistic and he has full custody of her but his father helps in many ways.    Current Outpatient Prescriptions:  Current Outpatient Medications   Medication     albuterol (PROAIR HFA/PROVENTIL HFA/VENTOLIN HFA) 108 (90 Base) MCG/ACT inhaler     aspirin (ASA) 81 MG EC tablet     baclofen (LIORESAL) 10 MG tablet     cholecalciferol (VITAMIN D3) 125 mcg (5000 units) capsule     Dalfampridine 10 MG TB12     dimethyl fumarate 240 MG CPDR     Docusate Sodium (COLACE PO)     HYDROcodone-acetaminophen (NORCO)  5-325 MG tablet     ibuprofen (ADVIL/MOTRIN) 200 MG tablet     modafinil (PROVIGIL) 200 MG tablet     amitriptyline (ELAVIL) 10 MG tablet     nortriptyline (PAMELOR) 25 MG capsule     WIXELA INHUB 250-50 MCG/DOSE inhaler     No current facility-administered medications for this visit.           ALLERGIES       Allergies   Allergen Reactions     Latex Anaphylaxis     Probable agent thought to have caused  The reaction.        Fingolimod Other (See Comments) and Palpitations     Chest pain hx.              REVIEW OF SYSTEMS:    Comprehensive review of systems otherwise was negative, including constitutional, head and neck, cardiovascular, pulmonary, gastrointestinal, endocrine, urologic, reproductive, rheumatic, hematologic, immunologic, dermatologic, and psychiatric.    Nutritional concerns: None  Driving issues: None   Safety concerns regarding living situations and safety at home: None  Risk of falls: None  Pain: None    PHYSICAL EXAM:    Hair, skin, nails, and joints were normal. Neck was supple without Lhermitte's phenomenon.  There was no percussion tenderness over the spine.     Mental status:  Patient is alert, attentive, and oriented x 3.  Language is coherent and fluent without dysarthria or aphasia.  Memory, comprehension and ability to follow commands were intact.        Cranial nerves: Pupils were round and reacted to light.  Extraocular movements were limited because of eye pain w movement. There was no face, jaw, palate or tongue weakness or atrophy. Hearing was grossly intact. Shrugging shoulder is normal     Motor exam: No atrophy or fasciculations.  No action or percussion myotonia or paramyotonia.  Manual muscle testing revealed the following MRC grade muscle power:    Right Left   Neck flexion 5     Neck extension 5     Shoulder ext rotation 5  5    Shoulder abduction 5 5   Elbow extension 5 5   Elbow flexion             5 5   Wrist extension 5 5   Wrist flexion 5 5   Finger extension 5 5   Finger  flexion 5 5   FDI 5 5   APB 5 5   Hip extension 5 5    Hip flexion 5- 5 -   Knee extension 5 5   Knee flexion 5 5   Dorsiflexion 5 5   Plantarflexion 5 5   Eversion 5 5   Inversion 5 5   Ext Hallucis Longus 5 5      Complex motor skills revealed normal coordination.  Finger-nose- finger and heel to shin were intact.       Sensory exam revealed vibration to be  >10/>10s at the toes, >20>20s at the fingers. PP decreased on the left hand   Pt was not able to walk on heels or toes and was able to do tandem without any difficulty.       Deep tendon reflexes:    Right Left   Triceps 2 2   Biceps 2 2   Brachioradialis 2 2   Knee jerk 3 3   Ankle jerk 3 3   Plantar responses were flexor bilaterally. Girard's negative b/l       REVIEW OF IMAGING STUDIES:    I personally reviewed the following images:    MRI of the brain from December 23, 2020 was compared with the one from November 2019 from an outside facility.  Lesion load is unchanged except for one new lesion currently not enhancing.     Cervical spinal cord shows at least 2 lesions at C1-2 and T1 significantly changed compared to 2019.  DJD is likely causing the neck pain especially due to significant C5-C6 left neural foraminal stenosis which could potentially cause the left upper extremity pain he complains about.  MRI of the thoracic spine shows no evidence of demyelinating lesions.    ASSESSMENT:    Relapsing remitting multiple sclerosis currently clinically and radiologically with stable, recently restarted on Tecfidera which helped for a couple of months due to insurance issues.  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    PLAN:    PT for the neck pain  Re-start Noptriptylline 10 mg po at bedtime to improve neuropathic pain  Repeat blood work in 6 months CBC and LFTs to look for occult toxicity to Tecfidera.    Continue current management      Finally I will follow the  patient up in 6 month(s) as long as the patient is doing well. I instructed the patient to call or mychart my office with any concerns or questions.    I spent 30 minutes in this visit, with >50% direct patient time spent counseling about prognosis, treatment options, and coordination of care.     My recommendations will be communicated back to the patient's physician(s) by mail.  Follow-up is expected to be with me.      Barbara Spain MD  Chief, Multiple Sclerosis Division  Department of Neurology  [facility] [place]

## 2020-12-31 ENCOUNTER — TELEPHONE (OUTPATIENT)
Dept: NEUROLOGY | Facility: CLINIC | Age: 40
End: 2020-12-31

## 2020-12-31 NOTE — TELEPHONE ENCOUNTER
Prior Authorization Approval    Authorization Effective Date: 12/1/2020  Authorization Expiration Date: 12/31/2021  Medication: Dimethyl fumarate 240MG Capsules (APPROVED)  Approved Dose/Quantity: 60/30 days  Reference #:     Insurance Company: [employer] - Phone [phone] Fax [phone]  Expected CoPay:       CoPay Card Available: No    Foundation Assistance Needed:    Which Pharmacy is filling the prescription (Not needed for infusion/clinic administered): [facility] - [address] [place] [address]  Pharmacy Notified: Yes  Patient Notified: Yes      PA renewal approved:

## 2021-01-15 ENCOUNTER — HEALTH MAINTENANCE LETTER (OUTPATIENT)
Age: 41
End: 2021-01-15

## 2021-01-20 ENCOUNTER — HOSPITAL ENCOUNTER (OUTPATIENT)
Dept: PHYSICAL THERAPY | Facility: CLINIC | Age: 41
Setting detail: THERAPIES SERIES
End: 2021-01-20
Attending: PSYCHIATRY & NEUROLOGY
Payer: COMMERCIAL

## 2021-01-20 DIAGNOSIS — M54.2 NECK PAIN: ICD-10-CM

## 2021-01-20 DIAGNOSIS — G35 MULTIPLE SCLEROSIS (H): ICD-10-CM

## 2021-01-20 PROCEDURE — 97140 MANUAL THERAPY 1/> REGIONS: CPT | Mod: GP | Performed by: PHYSICAL THERAPIST

## 2021-01-20 PROCEDURE — 97110 THERAPEUTIC EXERCISES: CPT | Mod: GP | Performed by: PHYSICAL THERAPIST

## 2021-01-20 PROCEDURE — 97161 PT EVAL LOW COMPLEX 20 MIN: CPT | Mod: GP | Performed by: PHYSICAL THERAPIST

## 2021-01-20 NOTE — PROGRESS NOTES
01/20/21 1000   Quick Adds   Quick Adds Certification   Type of Visit Initial OP PT Evaluation   General Information   Start of Care Date 01/20/21   Referring Physician Barbara Spain MD   Orders Evaluate and Treat as Indicated   Order Date 12/23/20   Medical Diagnosis MS, Neck pain   Onset of illness/injury or Date of Surgery 01/20/18   Special Instructions L arm pain, neck pain, possible radiculopathy   Surgical/Medical history reviewed Yes   Pertinent history of current problem (include personal factors and/or comorbidities that impact the POC) History of MS with neck pain for the past couple years.  He was diagnosed with a concussion 2 years ago on a shelf at a store and has had neck pain ever since.  He reports he was getting symptoms of radiculopathy down his left arm and hand but feels this has improved since December with doing exericses through his daughter's school along with her and feels this has helped.  He reports he was in a wheelchair and wore bilateral AFOs but after working with a zero gravity TM 6 years ago, he is an independent walker with no AFOs.  He has full custody of his daughter who is autistic and his father helps as well.  He also reports lower back pain and reports previous fracture falling out of a bunk bed.   Pertinent Visual History  He reports minor diplopia and has 3 pairs of glasses and reports problems seeing out of periphery so will turn head more and this may contribute to neck pain.   Patient role/Employment history Disabled   Living environment Apartment/condo   Home/Community Accessibility Comments Lives on second floor with elevator and has full custody of 8 year old daughter.   Patient/Family Goals Statement Wants neck to feel better.   General Information Comments He has support of parents and friends   Fall Risk Screen   Fall screen completed by PT   Have you fallen 2 or more times in the past year? No   Have you fallen and had an injury in the past year? No   Is  patient a fall risk? No   Abuse Screen (yes response referral indicated)   Feels Unsafe at Home or Work/School no   Feels Threatened by Someone no   Does Anyone Try to Keep You From Having Contact with Others or Doing Things Outside Your Home? no   Physical Signs of Abuse Present no   Pain   Patient currently in pain Yes   Pain location neck and low back   Pain rating 5/10   Pain comments Poor posture   Cognitive Status Examination   Orientation orientation to person, place and time   Follows Commands and Answers Questions 100% of the time;able to follow multistep instructions   Memory intact   Cognitive Comment He says he has been feeling really good lately and very limited brain fog from MS   Observation   Observation Brings daughter to appointment   Posture   Posture Forward head position;Protracted shoulders   Palpation   Palpation Left facets with decreaesd mobility at mid cervical spine   Range of Motion (ROM)   ROM Comment Decreased left cervicla rotation through 80% of range compared to the right with compensatory strategy fo trunk rotation.  Lateral cervical flexion to the left through 75% of range with reported tightness compared to the right.   Strength   Strength Comments Weakness through trunk as observed with forward psoture and will benefit from postural strengthening   Transfer Skills   Transfer Comments Independent   Gait   Gait Gait Analysis   Gait Analysis   Gait Pattern Used swing-through gait   Gait Deviations Noted decreased weight-shifting ability   Balance   Balance other (describe)   Balance Comments Increased righting on dydnamic surfaces   Sensory Examination   Sensory Perception other (describe)   Sensory Perception Comments Tingling/numbness in both hands.  If he does get tingling from neck, it is down his left arm   Planned Therapy Interventions   Planned Therapy Interventions ROM;strengthening;stretching   Clinical Impression   Criteria for Skilled Therapeutic Interventions Met yes,  treatment indicated   PT Diagnosis Neck pain, postural weakness   Influenced by the following impairments Neck and low back pain, decreased sensation through UEs, postural weakness, fatigue, chronic pain with MS, impaired balance   Functional limitations due to impairments Limited tolerance to functional activity with neck pain, difficulty turnig head when driving when pain is present, difficulty reaching due to neck pain   Clinical Presentation Stable/Uncomplicated   Clinical Presentation Rationale Currently not in MS flare, clinical presentation, symptom report   Clinical Decision Making (Complexity) Low complexity   Therapy Frequency other (see comments)  (biweekly)   Predicted Duration of Therapy Intervention (days/wks) 60 days   Risk & Benefits of therapy have been explained Yes   Patient, Family & other staff in agreement with plan of care Yes   Clinical Impression Comments Jovan presents with impaired posture with trunk weakness impacting cervicla spine with forward head and limitations wiht CROM.  He will benefit from skilled PT for postural strengthening and symptom management to reduce cervical and left arm pain. Current status of MS is well controlled.   GOALS   PT Eval Goals 1;2;3   Goal 1   Goal Identifier Neck pain   Goal Description Jovan will demonstrate ability to reach overhead with left UE and report no pain in 5/5 trials.   Target Date 03/20/21   Goal 2   Goal Identifier Posture   Goal Description Jovan will demonstrate neutral posture with gait and in stand with ability to stand for >15 minutes with tolerable pain.   Target Date 03/20/21   Goal 3   Goal Identifier Driving   Goal Description Jovan will report tolerable pain with head turns while driving for zamzam changes on a consistent basis.   Target Date 03/20/21   Total Evaluation Time   PT Mario Low Complexity Minutes (50535) 30   Therapy Certification   Certification date from 01/20/21   Certification date to 03/20/21   Medical Diagnosis  MS, neck pain   Certification I certify the need for these services furnished under this plan of treatment and while under my care.  (Physician co-signature of this document indicates review and certification of the therapy plan).

## 2021-01-20 NOTE — PROGRESS NOTES
[facility]        OUTPATIENT PHYSICAL THERAPY FUNCTIONAL EVALUATION  PLAN OF TREATMENT FOR OUTPATIENT REHABILITATION  (COMPLETE FOR INITIAL CLAIMS ONLY)  Patient's Last Name, First Name, M.I.  YOB: 1980  Jovan Cohen     Provider's Name   [facility]   Medical Record No.  [id number]     Start of Care Date:  01/20/21   Onset Date:  01/20/18   Type:     _X__PT   ____OT  ____SLP Medical Diagnosis:  MS, neck pain     PT Diagnosis:  Neck pain, postural weakness Visits from SOC:  1                              __________________________________________________________________________________  Plan of Treatment/Functional Goals:  ROM, strengthening, stretching           GOALS  Neck pain  Jovan will demonstrate ability to reach overhead with left UE and report no pain in 5/5 trials.  03/20/21    Posture  Jovan will demonstrate neutral posture with gait and in stand with ability to stand for >15 minutes with tolerable pain.  03/20/21    Driving  Jovan will report tolerable pain with head turns while driving for zamzam changes on a consistent basis.  03/20/21                                                           Therapy Frequency:  other (see comments)(biweekly)   Predicted Duration of Therapy Intervention:  60 days    Abigail Mane, PT                                    I CERTIFY THE NEED FOR THESE SERVICES FURNISHED UNDER        THIS PLAN OF TREATMENT AND WHILE UNDER MY CARE     (Physician co-signature of this document indicates review and certification of the therapy plan).                Certification Date From:  01/20/21   Certification Date To:  03/20/21    Referring Provider:  Barbara Spain MD    Initial Assessment  See Epic Evaluation- Start of Care Date: 01/20/21

## 2021-02-10 ENCOUNTER — HOSPITAL ENCOUNTER (OUTPATIENT)
Dept: PHYSICAL THERAPY | Facility: CLINIC | Age: 41
Setting detail: THERAPIES SERIES
End: 2021-02-10
Attending: PSYCHIATRY & NEUROLOGY
Payer: COMMERCIAL

## 2021-02-10 PROCEDURE — 97140 MANUAL THERAPY 1/> REGIONS: CPT | Mod: GP | Performed by: PHYSICAL THERAPIST

## 2021-02-10 PROCEDURE — 97110 THERAPEUTIC EXERCISES: CPT | Mod: GP | Performed by: PHYSICAL THERAPIST

## 2021-03-10 ENCOUNTER — HOSPITAL ENCOUNTER (OUTPATIENT)
Dept: PHYSICAL THERAPY | Facility: CLINIC | Age: 41
Setting detail: THERAPIES SERIES
End: 2021-03-10
Attending: PSYCHIATRY & NEUROLOGY
Payer: COMMERCIAL

## 2021-03-10 PROCEDURE — 97110 THERAPEUTIC EXERCISES: CPT | Mod: GP | Performed by: PHYSICAL THERAPIST

## 2021-03-10 PROCEDURE — 97140 MANUAL THERAPY 1/> REGIONS: CPT | Mod: GP | Performed by: PHYSICAL THERAPIST

## 2021-03-11 NOTE — PROGRESS NOTES
Outpatient Physical Therapy Discharge Note     Patient: Jovan Cohen  : 1980    Beginning/End Dates of Reporting Period:  2021 to 3/11/2021 Seen for a total of 3 visits    Referring Provider: Barbara Spain MD    Therapy Diagnosis: Neck pain and postural weakness     Client Self Report: He has a massage chair he has been using and overall feels better.    Objective Measurements:   CROM: Full in all planes with active motion            Goals:  Goal Identifier Neck pain   Goal Description Jovan will demonstrate ability to reach overhead with left UE and report no pain in 5/5 trials.   Target Date 21   Date Met  03/10/21   Progress:0/10 neck pain reported     Goal Identifier Posture   Goal Description Jovan will demonstrate neutral posture with gait and in stand with ability to stand for >15 minutes with tolerable pain.   Target Date 21   Date Met  03/10/21   Progress:Able to demonstrate standing for >15 minutes with no pain     Goal Identifier Driving   Goal Description Jovan will report tolerable pain with head turns while driving for zamzam changes on a consistent basis.   Target Date 21   Date Met  03/10/21   Progress:Reports no concerns driving     Progress Toward Goals:   Progress this reporting period: Good progress towards all goals and is currently reporting no pain and demonstrating full active range.    Plan:  Discharge from therapy.    Discharge:    Reason for Discharge: Patient has met all goals.    Equipment Issued: None    Discharge Plan: Patient to continue home program.

## 2021-06-01 DIAGNOSIS — G35 MULTIPLE SCLEROSIS (H): ICD-10-CM

## 2021-06-01 DIAGNOSIS — M54.2 NECK PAIN: ICD-10-CM

## 2021-06-01 RX ORDER — NORTRIPTYLINE HCL 10 MG
CAPSULE ORAL
Qty: 30 CAPSULE | Refills: 4 | Status: SHIPPED | OUTPATIENT
Start: 2021-06-01 | End: 2021-09-30

## 2021-06-01 NOTE — TELEPHONE ENCOUNTER
Received refill request for nortriptyline from [facility]; Patient was last seen in December and has follow up appointment in June with Dr. Spain; Refilled per MS refill protocol.    Miroslava Crowder MS RN Care Coordinator

## 2021-06-23 DIAGNOSIS — G35 MULTIPLE SCLEROSIS (H): ICD-10-CM

## 2021-06-23 RX ORDER — DALFAMPRIDINE 10 MG/1
TABLET, FILM COATED, EXTENDED RELEASE ORAL
Qty: 60 TABLET | Refills: 11 | Status: SHIPPED | OUTPATIENT
Start: 2021-06-23 | End: 2022-05-26

## 2021-06-23 NOTE — TELEPHONE ENCOUNTER
Received refill request for dalfampridine from [facility]; Patient was last seen in December and has follow up appointment in June with Dr. Spain. Refilled per MS refill protocol.    Pinky Satck RN

## 2021-08-10 ENCOUNTER — TELEPHONE (OUTPATIENT)
Dept: NEUROLOGY | Facility: CLINIC | Age: 41
End: 2021-08-10

## 2021-08-10 NOTE — TELEPHONE ENCOUNTER
I called the patient twice to follow up on his call about symptoms. He did not answer and his mail box was full. Will try to call later.

## 2021-08-11 NOTE — TELEPHONE ENCOUNTER
Called and spoke with the patient. Per patient, for the past week or so, he is having increased numbness/ tingling in his left arm. This is not a new symptom, but severity recently became worse. He is following up with a chiropractor and also does acupuncture which have been helpful.     I told him that symptoms do not sound like a MS relapse. It could be from moderate to severe left neural foraminal  Stenosis in cervical spine based on MRI from 12/2020.     In the past, he had PT for similar symptoms and he is planning to restart it soon. I told him to let us know if he needs a new order for PT.

## 2021-09-09 ENCOUNTER — VIRTUAL VISIT (OUTPATIENT)
Dept: NEUROLOGY | Facility: CLINIC | Age: 41
End: 2021-09-09
Attending: PSYCHIATRY & NEUROLOGY
Payer: COMMERCIAL

## 2021-09-09 DIAGNOSIS — G35 MULTIPLE SCLEROSIS (H): Primary | ICD-10-CM

## 2021-09-09 PROCEDURE — 99214 OFFICE O/P EST MOD 30 MIN: CPT | Mod: 95 | Performed by: PSYCHIATRY & NEUROLOGY

## 2021-09-09 NOTE — PROGRESS NOTES
Jovan is a 40 year old who is being evaluated via a billable video visit.      How would you like to obtain your AVS? Neilhart  If the video visit is dropped, the invitation should be resent by: [phone]      Video Start Time: 1:45 PM  Video-Visit Details    Type of service:  Video Visit    Video End Time:2:10 PM    Originating Location (pt. Location): Home    Distant Location (provider location):  [facility] MULTIPLE SCLEROSIS CLINIC [place]     Platform used for Video Visit: [facility] MULTIPLE SCLEROSIS CLINIC  FOLLOW UP VISIT           PRINCIPAL NEUROLOGIC DIAGNOSIS: Multiple Sclerosis        HISTORY OF ILLNESS:    This is a follow visit for this 40 year old right handed genetic male  With a history of MS. Who was last seen on  12-23-21.     At that time the patient was recommended to:    PT for the neck pain  Re-start Noptriptylline 10 mg po at bedtime to improve neuropathic pain  Repeat blood work in 6 months CBC and LFTs to look for occult toxicity to Tecfidera.       Since last visit he is doing well but gets hand numbness when sitting for long periods of time. He is exercising doing strengthening exercises and also going to the chiropractor. He also uses a tens unit at home which seems to help.    He has been working hard on getting his daughter transferred to a school that will allow virtual attendance and he did which gives him peace of mind.    Current Symptoms:  1. Leg fatigue on exertion (some difficulty doing grocery shopping)  2. Hand numbness after sitting  3. Neck/shoulder blade pain      Current Outpatient Prescriptions:  Current Outpatient Medications   Medication     albuterol (PROAIR HFA/PROVENTIL HFA/VENTOLIN HFA) 108 (90 Base) MCG/ACT inhaler     aspirin (ASA) 81 MG EC tablet     baclofen (LIORESAL) 10 MG tablet     cholecalciferol (VITAMIN D3) 125 mcg (5000 units) capsule     Dalfampridine 10 MG TB12     dimethyl fumarate 240 MG CPDR      Docusate Sodium (COLACE PO)     HYDROcodone-acetaminophen (NORCO) 5-325 MG tablet     ibuprofen (ADVIL/MOTRIN) 200 MG tablet     modafinil (PROVIGIL) 200 MG tablet     nortriptyline (PAMELOR) 10 MG capsule     WIXELA INHUB 250-50 MCG/DOSE inhaler     No current facility-administered medications for this visit.          ALLERGIES       Allergies   Allergen Reactions     Latex Anaphylaxis     Probable agent thought to have caused  The reaction.        Fingolimod Other (See Comments) and Palpitations     Chest pain hx.                ASSESSMENT:    Relapsing remitting multiple sclerosis, clinically stable on Tecfidera.  Fatigue an issue despite Provigil 200 mg a day and dalfampridine.  Reports benefit from physical therapy specifically on neck pain.    PLAN:    Continue Tecfidera, obtain lab to look for occult toxicity to it.  Consider COVID-19 vaccination in the near future.    Repeat MRI of the brain cervical and thoracic spine without contrast to confirm disease stability from the imaging standpoint.  Face-to-face visit in 2 months.    Re-start PT sessions at [facility].    Finally I will follow the patient up in 2.5 month(s) as long as the patient is doing well. I instructed the patient to call or mychart my office with any concerns or questions.    I spent 25 minutes in this visit, with >50% direct patient time spent counseling about prognosis, treatment options, and coordination of care.     My recommendations will be communicated back to the patient's physician(s) by mail.  Follow-up is expected to be with me.      Barbara Spain MD  Chief, Multiple Sclerosis Division  Department of Neurology  [facility]      BILLING TIME DOCUMENTATION:   The total TIME spent on this patient on the date of the encounter/appointment was 25 minutes.       TOTAL TIME includes:   Time spent preparing to see the patient (reviewing records and tests)   Time spent face to face (or over the phone)  with the patient   Time spent ordering tests, medications, procedures and referrals  Time spent documenting clinical information in Epic  Time discussing the case with other providers

## 2021-09-09 NOTE — LETTER
9/9/2021       RE: Jovan Cohen  [address]  [address]  [facility] [place] [facility] [address]     Dear Colleague,    Thank you for referring your patient, Jovan Cohen, to the [facility] MULTIPLE SCL[facility]OSIS CLINIC [place] at [facility]. Please see a copy of my visit note below.    THE [facility] MULTIPLE SCLEROSIS CLINIC  FOLLOW UP VISIT     PRINCIPAL NEUROLOGIC DIAGNOSIS: Multiple Sclerosis    HISTORY OF ILLNESS:    This is a follow visit for this 40 year old right handed genetic male  With a history of MS. Who was last seen on  12-23-21.     At that time the patient was recommended to:    PT for the neck pain  Re-start Noptriptylline 10 mg po at bedtime to improve neuropathic pain  Repeat blood work in 6 months CBC and LFTs to look for occult toxicity to Tecfidera.       Since last visit he is doing well but gets hand numbness when sitting for long periods of time. He is exercising doing strengthening exercises and also going to the chiropractor. He also uses a tens unit at home which seems to help.    He has been working hard on getting his daughter transferred to a school that will allow virtual attendance and he did which gives him peace of mind.    Current Symptoms:  1. Leg fatigue on exertion (some difficulty doing grocery shopping)  2. Hand numbness after sitting  3. Neck/shoulder blade pain      Current Outpatient Prescriptions:  Current Outpatient Medications   Medication     albuterol (PROAIR HFA/PROVENTIL HFA/VENTOLIN HFA) 108 (90 Base) MCG/ACT inhaler     aspirin (ASA) 81 MG EC tablet     baclofen (LIORESAL) 10 MG tablet     cholecalciferol (VITAMIN D3) 125 mcg (5000 units) capsule     Dalfampridine 10 MG TB12     dimethyl fumarate 240 MG CPDR     Docusate Sodium (COLACE PO)     HYDROcodone-acetaminophen (NORCO) 5-325 MG tablet     ibuprofen (ADVIL/MOTRIN) 200 MG tablet     modafinil (PROVIGIL) 200 MG tablet     nortriptyline (PAMELOR) 10  MG capsule     WIXELA INHUB 250-50 MCG/DOSE inhaler     No current facility-administered medications for this visit.     ALLERGIES    Allergies   Allergen Reactions     Latex Anaphylaxis     Probable agent thought to have caused  The reaction.        Fingolimod Other (See Comments) and Palpitations     Chest pain hx.        ASSESSMENT:    Relapsing remitting multiple sclerosis, clinically stable on Tecfidera.  Fatigue an issue despite Provigil 200 mg a day and dalfampridine.  Reports benefit from physical therapy specifically on neck pain.    PLAN:    Continue Tecfidera, obtain lab to look for occult toxicity to it.  Consider COVID-19 vaccination in the near future.    Repeat MRI of the brain cervical and thoracic spine without contrast to confirm disease stability from the imaging standpoint.  Face-to-face visit in 2 months.    Re-start PT sessions at [facility].    Finally I will follow the patient up in 2.5 month(s) as long as the patient is doing well. I instructed the patient to call or mychart my office with any concerns or questions.    I spent 25 minutes in this visit, with >50% direct patient time spent counseling about prognosis, treatment options, and coordination of care.     My recommendations will be communicated back to the patient's physician(s) by mail.  Follow-up is expected to be with me.      Barbara Spain MD  Chief, Multiple Sclerosis Division  Department of Neurology  [facility]      BILLING TIME DOCUMENTATION:   The total TIME spent on this patient on the date of the encounter/appointment was 25 minutes.       TOTAL TIME includes:   Time spent preparing to see the patient (reviewing records and tests)   Time spent face to face (or over the phone) with the patient   Time spent ordering tests, medications, procedures and referrals  Time spent documenting clinical information in Epic  Time discussing the case with other providers                    Again,  thank you for allowing me to participate in the care of your patient.      Sincerely,    Barbara Spain MD

## 2021-09-19 ENCOUNTER — HEALTH MAINTENANCE LETTER (OUTPATIENT)
Age: 41
End: 2021-09-19

## 2021-09-30 DIAGNOSIS — M54.2 NECK PAIN: ICD-10-CM

## 2021-09-30 DIAGNOSIS — G35 MULTIPLE SCLEROSIS (H): ICD-10-CM

## 2021-09-30 RX ORDER — NORTRIPTYLINE HCL 10 MG
CAPSULE ORAL
Qty: 30 CAPSULE | Refills: 4 | Status: SHIPPED | OUTPATIENT
Start: 2021-09-30 | End: 2022-11-28

## 2021-10-06 DIAGNOSIS — G35 MULTIPLE SCLEROSIS (H): ICD-10-CM

## 2021-10-06 RX ORDER — DIMETHYL FUMARATE 240 MG/1
240 CAPSULE ORAL 2 TIMES DAILY
Qty: 60 CAPSULE | Refills: 11 | Status: SHIPPED | OUTPATIENT
Start: 2021-10-06 | End: 2022-09-16

## 2021-10-06 NOTE — TELEPHONE ENCOUNTER
Received refill request for Tecfidera from [facility]; Patient was last seen on 9/09/2021 and has follow up appointment on 1/12/2022 with Barbara Spain. Pended to MS pool for review/approval    Jyoti De León MA

## 2021-10-19 ENCOUNTER — HOSPITAL ENCOUNTER (OUTPATIENT)
Dept: PHYSICAL THERAPY | Facility: CLINIC | Age: 41
Setting detail: THERAPIES SERIES
End: 2021-10-19
Attending: PSYCHIATRY & NEUROLOGY
Payer: COMMERCIAL

## 2021-10-19 DIAGNOSIS — G35 MULTIPLE SCLEROSIS (H): ICD-10-CM

## 2021-10-19 PROCEDURE — 97140 MANUAL THERAPY 1/> REGIONS: CPT | Mod: GP | Performed by: PHYSICAL THERAPIST

## 2021-10-19 PROCEDURE — 97161 PT EVAL LOW COMPLEX 20 MIN: CPT | Mod: GP | Performed by: PHYSICAL THERAPIST

## 2021-10-19 PROCEDURE — 97110 THERAPEUTIC EXERCISES: CPT | Mod: GP | Performed by: PHYSICAL THERAPIST

## 2021-10-19 NOTE — PROGRESS NOTES
Rehabilitation Services        OUTPATIENT PHYSICAL THERAPY FUNCTIONAL EVALUATION  PLAN OF TREATMENT FOR OUTPATIENT REHABILITATION  (COMPLETE FOR INITIAL CLAIMS ONLY)  Patient's Last Name, First Name, M.I.  YOB: 1980  Jovan Cohen     Provider's Name   Abigail Mane PT   Medical Record No.  [id number]     Start of Care Date:  10/19/21   Onset Date:  09/09/21   Type:     _X__PT   ____OT  ____SLP Medical Diagnosis:  Multiple Sclerosis     PT Diagnosis:  Neck pain Visits from SOC:  1                              __________________________________________________________________________________  Plan of Treatment/Functional Goals:  neuromuscular re-education, ROM, strengthening, stretching           GOALS  Neck pain  Jovan will report no neck pain with sitting and performing computer work for 45 minute intervlas for 1 week.  12/17/21    driving  Jovan will report increased ease of turning his head to the left when drivign for changing lanes on a daily basis.  12/17/21                        Therapy Frequency:  other (see comments)   Predicted Duration of Therapy Intervention:  biweekly for 60 days    Abigail Mane PT                                    I CERTIFY THE NEED FOR THESE SERVICES FURNISHED UNDER        THIS PLAN OF TREATMENT AND WHILE UNDER MY CARE     (Physician co-signature of this document indicates review and certification of the therapy plan).                Certification Date From:  10/19/21   Certification Date To:  12/17/21    Referring Provider:  Barbara Spain MD    Initial Assessment  See Epic Evaluation- Start of Care Date: 10/19/21

## 2021-10-19 NOTE — PROGRESS NOTES
10/19/21 0800   Quick Adds   Quick Adds Certification   Type of Visit Initial OP PT Evaluation   General Information   Start of Care Date 10/19/21   Referring Physician Barbara Spain MD   Orders Evaluate and Treat as Indicated   Order Date 09/09/21   Medical Diagnosis MS   Onset of illness/injury or Date of Surgery 09/09/21   Surgical/Medical history reviewed Yes   Pertinent history of current problem (include personal factors and/or comorbidities that impact the POC) MS with neck and left UE pain with radiating pain and numbness down the left arm.  He reprots it was much improved after last episode of PT.   Pertinent Visual History  Prescription glassess   Patient role/Employment history Disabled   Living environment Apartment/condo   Home/Community Accessibility Comments Caregiving for daughter including online school.   Assistive Devices Comments Bilateral wrist splints   Patient/Family Goals Statement Wants to reduce neck and arm pain   Fall Risk Screen   Fall screen completed by PT   Have you fallen 2 or more times in the past year? No   Have you fallen and had an injury in the past year? No   Is patient a fall risk? No   Abuse Screen (yes response referral indicated)   Feels Unsafe at Home or Work/School no   Feels Threatened by Someone no   Does Anyone Try to Keep You From Having Contact with Others or Doing Things Outside Your Home? no   Physical Signs of Abuse Present no   Pain   Patient currently in pain Yes   Pain location Left shoulder and arm   Cognitive Status Examination   Orientation orientation to person, place and time   Follows Commands and Answers Questions 100% of the time;able to follow multistep instructions   Posture   Posture Normal   Range of Motion (ROM)   ROM Comment Right cervical lateral flexion with pain. Left lateral flexion through 80% range and left rotation through 80% cervical muscles   Strength   Strength Comments 5/5 LE MMT   Transfer Skills   Transfer Comments Independent    Gait   Gait Comments Normal gait pattern   Balance   Balance no deficits were identified   Sensory Examination   Sensory Perception other (describe)   Sensory Perception Comments Tingling down left arm   Planned Therapy Interventions   Planned Therapy Interventions neuromuscular re-education;ROM;strengthening;stretching   Clinical Impression   Criteria for Skilled Therapeutic Interventions Met yes, treatment indicated   PT Diagnosis Neck pain   Influenced by the following impairments Neck pain, limited CROM, postural weakness   Functional limitations due to impairments Limited tolerance for seated activities, difficulty turning head when changing lanes driving, muscle stiffness and tightness.   Clinical Presentation Stable/Uncomplicated   Clinical Presentation Rationale Symptom report, clinical presentation   Clinical Decision Making (Complexity) Low complexity   Therapy Frequency other (see comments)   Predicted Duration of Therapy Intervention (days/wks) biweekly for 60 days   Risk & Benefits of therapy have been explained Yes   Patient, Family & other staff in agreement with plan of care Yes   Clinical Impression Comments Jovan presents with decreaed CROM and postural weakness limiting his ability to perform daily tasks and help his daugther with her computer work.  He will benefit from skilled PT to address impairments and increase toelrance to functional activity with in complicating factors of multiple sclerosis.   GOALS   PT Eval Goals 1;2   Goal 1   Goal Identifier Neck pain   Goal Description Jovan will report no neck pain with sitting and performing computer work for 45 minute intervlas for 1 week.   Target Date 12/17/21   Goal 2   Goal Identifier driving   Goal Description Jovan will report increased ease of turning his head to the left when drivign for changing lanes on a daily basis.   Target Date 12/17/21   Total Evaluation Time   PT Mario Low Complexity Minutes (76657) 30   Therapy Certification    Certification date from 10/19/21   Certification date to 12/17/21   Medical Diagnosis Multiple Sclerosis   Certification I certify the need for these services furnished under this plan of treatment and while under my care.  (Physician co-signature of this document indicates review and certification of the therapy plan).

## 2021-11-13 ENCOUNTER — HOSPITAL ENCOUNTER (OUTPATIENT)
Dept: PHYSICAL THERAPY | Facility: CLINIC | Age: 41
Setting detail: THERAPIES SERIES
End: 2021-11-13
Attending: PSYCHIATRY & NEUROLOGY
Payer: COMMERCIAL

## 2021-11-13 PROCEDURE — 97110 THERAPEUTIC EXERCISES: CPT | Mod: GP | Performed by: PHYSICAL THERAPIST

## 2021-11-13 PROCEDURE — 97140 MANUAL THERAPY 1/> REGIONS: CPT | Mod: GP | Performed by: PHYSICAL THERAPIST

## 2021-12-21 ENCOUNTER — HOSPITAL ENCOUNTER (OUTPATIENT)
Dept: PHYSICAL THERAPY | Facility: CLINIC | Age: 41
Setting detail: THERAPIES SERIES
End: 2021-12-21
Attending: PSYCHIATRY & NEUROLOGY
Payer: COMMERCIAL

## 2021-12-21 PROCEDURE — 97140 MANUAL THERAPY 1/> REGIONS: CPT | Mod: GP | Performed by: PHYSICAL THERAPIST

## 2021-12-21 PROCEDURE — 97110 THERAPEUTIC EXERCISES: CPT | Mod: GP | Performed by: PHYSICAL THERAPIST

## 2021-12-21 NOTE — PROGRESS NOTES
[facility]    OUTPATIENT PHYSICAL THERAPY  PLAN OF TREATMENT FOR OUTPATIENT REHABILITATION AND PROGRESS NOTE           Patient's Last Name, First Name, Jovan Mcgarry Date of Birth  1980   Provider's Name  [facility] Medical Record No.  [id number]    Onset Date  9/9/2021 Start of Care Date  10/19/2021   Type:     _X_PT   ___OT   ___SLP Medical Diagnosis  Multiple Sclerosis   PT Diagnosis  Neck pain Plan of Treatment  Frequency/Duration: biweekly for 90 days  Certification date from 12/18/2021 to 3/17/2022     Goals:  Goal Identifier Neck pain   Goal Description Jovan will report no neck pain with sitting and performing computer work for 45 minute intervlas for 1 week.   Target Date 03/17/21   Date Met      Progress (detail required for progress note):  Goal extended due to reporting improved ability to sit and help his daughter with school but at the end of the day his neck is sore and he will get symptoms intermittently with tingling down left arm. He reports nerve glides are helping and has tolerable pain not havign to stop and take as many rest breaks during the day.     Goal Identifier driving   Goal Description Jovan will report increased ease of turning his head to the left when drivign for changing lanes on a daily basis.   Target Date 12/17/21   Date Met   12/21/2021   Progress (detail required for progress note):  Reports this has been consistantly better.     Goal Identifier Sitting posture   Goal Description Jovan will demonstrate the ability to sit with upright posture for 30 minute intervals to assist his daughter with school on a daily basis.   Target Date 03/17/22   Date Met      Progress (detail required for progress note):  New Goal- He is going to purchase a better chair to support posture for helping his daughter with daily school  work.         Beginning/End Dates of Progress Note Reporting Period:  10/19/2021 to 12/21/2021    Progress Toward Goals:   Progress this reporting period: Good progress towards goals with ability to expand with improved neck pain and CROM for functional activities such as driving.  Sitting posture can still be difficulty to maintain good posture for 30 minutes or more when helping his daughter with schoolwork.    Client Self (Subjective) Report for Progress Note Reporting Period: He comes reporting that his neck is better      Objective Measurements: Improved CROM into left rotation now through full functional range.                                                I CERTIFY THE NEED FOR THESE SERVICES FURNISHED UNDER        THIS PLAN OF TREATMENT AND WHILE UNDER MY CARE     (Physician co-signature of this document indicates review and certification of the therapy plan).                Referring Provider: MD Abigail Santos, PT

## 2021-12-21 NOTE — PROGRESS NOTES
12/21/21 1000   Signing Clinician's Name / Credentials   Signing clinician's name / credentials Abigail Mane PT   Session Number   Session Number 3/10   Progress Note/Recertification   Recertification Due Date 03/17/22   Goal 1   Goal Identifier Neck pain   Goal Description Jovan will report no neck pain with sitting and performing computer work for 45 minute intervlas for 1 week.   Target Date 12/17/21   Goal 2   Goal Identifier driving   Goal Description Jovan will report increased ease of turning his head to the left when drivign for changing lanes on a daily basis.   Target Date 12/17/21   Goal 3   Goal Identifier Sitting posture   Goal Description Jovan will demonstrate the ability to sit with upright posture for 30 minute intervals to assist his daughter with school on a daily basis.   Target Date 03/17/22   Subjective Report   Subjective Report He comes reporting that his neck is better   Therapeutic Procedure/exercise   Therapeutic Procedures: strength, endurance, ROM, flexibillity minutes (17335) 25   Skilled Intervention Postural activation, added core activation   Patient Response He reports increased awareness of posture at home but needs a new office chair.   Treatment Detail Performed scapular activation, pectoral lengthening, median nerve glides on left side; bilateral trunk lateral flexion and rotation with focus on upright sitting mechanics. sacral roundign demonstrated so focused on abdiminal activation with supine bridges using a ball between knees to reduce leg cramping and then can perform x 10 with 5 second holds. Seated reaching and wall stand with arms overhead and full shoulder flexion when in supine for UE and trunk lengthening.   Manual Therapy   Manual Therapy: Mobilization, MFR, MLD, friction massage minutes (33973) 15   Skilled Intervention STM, trigger point release and facet mobilization   Patient Response Notes improvments and demonstrating improved CROM into left rotation    Treatment Detail Trigger point release on the R today versus L with facet mobilization Grade 2 with good morbility now with full left cervical rotation and reports increased ease turning his head.  STM to upper trap area for pain relief and then reports no symptoms down his left arm. Followed up with over head lengthening and nerve glides to median nerve on the left.   Education   Learner Patient   Readiness Acceptance   Method Booklet/handout;Explanation;Demonstration   Response Verbalizes Understanding;Demonstrates Understanding   Plan   Homework ADDED supioen bridges with ball in between knees   Home program CROM with towel, added overhead lift off mat with dowel, median nerve flossing to left UE at wall and arm outstretched with hand wave   Updates to plan of care Will see in one month   Plan for next session Nerve glides, STM, goals   Total Session Time   Timed Code Treatment Minutes 40   Total Treatment Time (sum of timed and untimed services) 40   AMBULATORY CLINICS ONLY-MEDICAL AND TREATMENT DIAGNOSIS   Medical Diagnosis MS   PT Diagnosis Neck pain

## 2021-12-30 ENCOUNTER — TELEPHONE (OUTPATIENT)
Dept: NEUROLOGY | Facility: CLINIC | Age: 41
End: 2021-12-30
Payer: COMMERCIAL

## 2021-12-31 NOTE — TELEPHONE ENCOUNTER
Prior Authorization Approval    Authorization Effective Date: 11/30/2021  Authorization Expiration Date: 12/31/2022  Medication: Dimethyl Fumarate--APPROVED  Approved Dose/Quantity: UD  Reference #: Key: [id number]   Insurance Company: [employer] - Phone [phone] Fax [phone]  Expected CoPay:       CoPay Card Available:      Foundation Assistance Needed:    Which Pharmacy is filling the prescription (Not needed for infusion/clinic administered):    Pharmacy Notified:    Patient Notified:

## 2022-01-18 ENCOUNTER — HOSPITAL ENCOUNTER (OUTPATIENT)
Dept: PHYSICAL THERAPY | Facility: CLINIC | Age: 42
Setting detail: THERAPIES SERIES
End: 2022-01-18
Attending: PSYCHIATRY & NEUROLOGY
Payer: COMMERCIAL

## 2022-01-18 PROCEDURE — 97140 MANUAL THERAPY 1/> REGIONS: CPT | Mod: GP | Performed by: PHYSICAL THERAPIST

## 2022-01-18 PROCEDURE — 97110 THERAPEUTIC EXERCISES: CPT | Mod: GP | Performed by: PHYSICAL THERAPIST

## 2022-01-27 ENCOUNTER — TELEPHONE (OUTPATIENT)
Dept: NEUROLOGY | Facility: CLINIC | Age: 42
End: 2022-01-27
Payer: COMMERCIAL

## 2022-01-27 ENCOUNTER — VIRTUAL VISIT (OUTPATIENT)
Dept: NEUROLOGY | Facility: CLINIC | Age: 42
End: 2022-01-27
Attending: PSYCHIATRY & NEUROLOGY
Payer: COMMERCIAL

## 2022-01-27 DIAGNOSIS — G35 MULTIPLE SCLEROSIS (H): Primary | ICD-10-CM

## 2022-01-27 DIAGNOSIS — M54.2 NECK PAIN: ICD-10-CM

## 2022-01-27 PROCEDURE — 99213 OFFICE O/P EST LOW 20 MIN: CPT | Mod: 95 | Performed by: PSYCHIATRY & NEUROLOGY

## 2022-01-27 NOTE — PROGRESS NOTES
Jovan is a 41 year old who is being evaluated via a billable video visit.      How would you like to obtain your AVS? Hina  If the video visit is dropped, the invitation should be resent by: Other e-mail: HINA  Will anyone else be joining your video visit? No      Video Start Time: 4:41 PM  Video-Visit Details    Type of service:  Video Visit    Video End Time:5:07 PM    Originating Location (pt. Location): Home    Distant Location (provider location):  [facility] MULTIPLE SCLEROSIS CLINIC [place]     Platform used for Video Visit: [employer]    THE [facility] MULTIPLE SCLEROSIS CLINIC  FOLLOW UP VISIT           PRINCIPAL NEUROLOGIC DIAGNOSIS: Multiple Sclerosis        HISTORY OF ILLNESS:    This is a follow visit for this 41 year old right handed genetic male  With a history of MS on DMF. Who was last seen on  9-21.    At that time the patient was recommended to:    ASSESSMENT:     Relapsing remitting multiple sclerosis, clinically stable on Tecfidera.  Fatigue an issue despite Provigil 200 mg a day and dalfampridine.  Reports benefit from physical therapy specifically on neck pain.     PLAN:     Continue Tecfidera, obtain lab to look for occult toxicity to it.  Consider COVID-19 vaccination in the near future.     Repeat MRI of the brain cervical and thoracic spine without contrast to confirm disease stability from the imaging standpoint.  Face-to-face visit in 2 months.     Re-start PT sessions at [facility].    Since last visit he broke his foot 2 days before Doug and is still healing. He has been doing PT at [facility] in [facility] and feels like it makes a big difference in terms of his overall wellbeing, especially his neck pain and arm numbness.    He has not undergone MRI's because he forgets to schedule.them. He is using THC daily but he was advised to reduce to TIW    Current Symptoms:  1. L arm numbness  2. MCI          Current Outpatient Prescriptions:  Current  Outpatient Medications   Medication     albuterol (PROAIR HFA/PROVENTIL HFA/VENTOLIN HFA) 108 (90 Base) MCG/ACT inhaler     aspirin (ASA) 81 MG EC tablet     baclofen (LIORESAL) 10 MG tablet     cholecalciferol (VITAMIN D3) 125 mcg (5000 units) capsule     Dalfampridine 10 MG TB12     dimethyl fumarate 240 MG CPDR     HYDROcodone-acetaminophen (NORCO) 5-325 MG tablet     ibuprofen (ADVIL/MOTRIN) 200 MG tablet     modafinil (PROVIGIL) 200 MG tablet     nortriptyline (PAMELOR) 10 MG capsule     WIXELA INHUB 250-50 MCG/DOSE inhaler     No current facility-administered medications for this visit.          ALLERGIES       Allergies   Allergen Reactions     Latex Anaphylaxis     Probable agent thought to have caused  The reaction.        Fingolimod Other (See Comments) and Palpitations     Chest pain hx.              ASSESSMENT:  Relapsing remitting multiple sclerosis, clinically stable on dimethyl fumarate.  Needs to undergo repeat MRIs of the brain cervical and thoracic spine before next visit.  He has been unable to do so for various reasons including the pandemic.    PLAN:    Continue current management and consider decreasing use of THC to 2-3 times a week only and steadily.  May take magnesium 400 mg at night to help with cramps.  Repeat blood work to look for occult toxicity to dimethyl fumarate and repeat MRI of the brain cervical and thoracic spine with and without contrast.  Also ordered.    He will be referred to physical therapy at the [facility].  He has benefited from the care provided there and would like to come back.    Finally I will follow the patient up in 6 month(s) as long as the patient is doing well. I instructed the patient to call or mychart my office with any concerns or questions.    I spent 30 minutes in this visit, with >50% direct patient time spent counseling about prognosis, treatment options, and coordination of care.     My recommendations will be communicated back to  the patient's physician(s) by mail.  Follow-up is expected to be with me.      Barbara Spain MD  Chief, Multiple Sclerosis Division  Department of Neurology  [facility]      BILLING TIME DOCUMENTATION:   The total TIME spent on this patient on the date of the encounter/appointment was 25 minutes.       TOTAL TIME includes:   Time spent preparing to see the patient (reviewing records and tests)   Time spent face to face (or over the phone) with the patient   Time spent ordering tests, medications, procedures and referrals  Time spent documenting clinical information in Epic  Time discussing the case with other providers

## 2022-01-27 NOTE — LETTER
1/27/2022       RE: Jovan Cohen  [address]  [address]  [facility] [address]     Dear Colleague,    Thank you for referring your patient, Jovan Cohen, to the [facility] MULTIPLE S[facility] CLINIC [place] at [facility]. Please see a copy of my visit note below.      THE [facility] MULTIPLE SCLEROSIS CLINIC  FOLLOW UP VISIT       PRINCIPAL NEUROLOGIC DIAGNOSIS: Multiple Sclerosis        HISTORY OF ILLNESS:    This is a follow visit for this 41 year old right handed genetic male  With a history of MS on DMF. Who was last seen on  9-21.    At that time the patient was recommended to:    ASSESSMENT:     Relapsing remitting multiple sclerosis, clinically stable on Tecfidera.  Fatigue an issue despite Provigil 200 mg a day and dalfampridine.  Reports benefit from physical therapy specifically on neck pain.     PLAN:     Continue Tecfidera, obtain lab to look for occult toxicity to it.  Consider COVID-19 vaccination in the near future.     Repeat MRI of the brain cervical and thoracic spine without contrast to confirm disease stability from the imaging standpoint.  Face-to-face visit in 2 months.     Re-start PT sessions at [facility].    Since last visit he broke his foot 2 days before [place] and is still healing. He has been doing PT at [facility] in [facility] and feels like it makes a big difference in terms of his overall wellbeing, especially his neck pain and arm numbness.    He has not undergone MRI's because he forgets to schedule.them. He is using THC daily but he was advised to reduce to TIW    Current Symptoms:  1. L arm numbness  2. MCI    Current Outpatient Prescriptions:  Current Outpatient Medications   Medication     albuterol (PROAIR HFA/PROVENTIL HFA/VENTOLIN HFA) 108 (90 Base) MCG/ACT inhaler     aspirin (ASA) 81 MG EC tablet     baclofen (LIORESAL) 10 MG tablet     cholecalciferol (VITAMIN D3) 125 mcg (5000 units)  capsule     Dalfampridine 10 MG TB12     dimethyl fumarate 240 MG CPDR     HYDROcodone-acetaminophen (NORCO) 5-325 MG tablet     ibuprofen (ADVIL/MOTRIN) 200 MG tablet     modafinil (PROVIGIL) 200 MG tablet     nortriptyline (PAMELOR) 10 MG capsule     WIXELA INHUB 250-50 MCG/DOSE inhaler     No current facility-administered medications for this visit.          ALLERGIES       Allergies   Allergen Reactions     Latex Anaphylaxis     Probable agent thought to have caused  The reaction.        Fingolimod Other (See Comments) and Palpitations     Chest pain hx.              ASSESSMENT:  Relapsing remitting multiple sclerosis, clinically stable on dimethyl fumarate.  Needs to undergo repeat MRIs of the brain cervical and thoracic spine before next visit.  He has been unable to do so for various reasons including the pandemic.    PLAN:    Continue current management and consider decreasing use of THC to 2-3 times a week only and steadily.  May take magnesium 400 mg at night to help with cramps.  Repeat blood work to look for occult toxicity to dimethyl fumarate and repeat MRI of the brain cervical and thoracic spine with and without contrast.  Also ordered.    He will be referred to physical therapy at the [facility].  He has benefited from the care provided there and would like to come back.    Finally I will follow the patient up in 6 month(s) as long as the patient is doing well. I instructed the patient to call or mychart my office with any concerns or questions.    I spent 30 minutes in this visit, with >50% direct patient time spent counseling about prognosis, treatment options, and coordination of care.     My recommendations will be communicated back to the patient's physician(s) by mail.  Follow-up is expected to be with me.      Barbara Spain MD  Chief, Multiple Sclerosis Division  Department of Neurology  [facility]  Clinical [facility] Center      BILLING TIME DOCUMENTATION:   The  total TIME spent on this patient on the date of the encounter/appointment was 25 minutes.       TOTAL TIME includes:   Time spent preparing to see the patient (reviewing records and tests)   Time spent face to face (or over the phone) with the patient   Time spent ordering tests, medications, procedures and referrals  Time spent documenting clinical information in Epic  Time discussing the case with other providers

## 2022-03-02 NOTE — PROGRESS NOTES
[facility]    OUTPATIENT PHYSICAL THERAPY  PLAN OF TREATMENT FOR OUTPATIENT REHABILITATION AND PROGRESS NOTE           Patient's Last Name, First Name, Jovan Mcgarry Date of Birth  1980   Provider's Name  [facility] Medical Record No.  [id number]    Onset Date  9/9/2021 Start of Care Date  10/19/2021   Type:     _X_PT   ___OT   ___SLP Medical Diagnosis  Multiple Sclerosis   PT Diagnosis  Neck pain Plan of Treatment  Frequency/Duration: biweekly for 90 days  Certification date from 12/18/2021 to 3/17/2022     Goals:  Goal Identifier Neck pain   Goal Description Jovan will report no neck pain with sitting and performing computer work for 45 minute intervlas for 1 week.   Target Date 03/17/21   Date Met      Progress (detail required for progress note):  Goal extended due to reporting improved ability to sit and help his daughter with school but at the end of the day his neck is sore and he will get symptoms intermittently with tingling down left arm. He reports nerve glides are helping and has tolerable pain not havign to stop and take as many rest breaks during the day.     Goal Identifier driving   Goal Description Jovan will report increased ease of turning his head to the left when drivign for changing lanes on a daily basis.   Target Date 12/17/21   Date Met   12/21/2021   Progress (detail required for progress note):  Reports this has been consistantly better.     Goal Identifier Sitting posture   Goal Description Jovan will demonstrate the ability to sit with upright posture for 30 minute intervals to assist his daughter with school on a daily basis.   Target Date 03/17/22   Date Met      Progress (detail required for progress note):  New Goal- He is going to purchase a better chair to support posture for helping his daughter with daily school  work.         Beginning/End Dates of Progress Note Reporting Period:  10/19/2021 to 12/21/2021    Progress Toward Goals:   Progress this reporting period: Good progress towards goals with ability to expand with improved neck pain and CROM for functional activities such as driving.  Sitting posture can still be difficulty to maintain good posture for 30 minutes or more when helping his daughter with schoolwork.    Client Self (Subjective) Report for Progress Note Reporting Period: He comes reporting that his neck is better      Objective Measurements: Improved CROM into left rotation now through full functional range.                                                I CERTIFY THE NEED FOR THESE SERVICES FURNISHED UNDER        THIS PLAN OF TREATMENT AND WHILE UNDER MY CARE     (Physician co-signature of this document indicates review and certification of the therapy plan).                Referring Provider: MD Abigail Alfaro, PT

## 2022-03-06 ENCOUNTER — HEALTH MAINTENANCE LETTER (OUTPATIENT)
Age: 42
End: 2022-03-06

## 2022-03-16 NOTE — TELEPHONE ENCOUNTER
"[facility] Call Center    Phone Message    May a detailed message be left on voicemail: yes     Reason for Call: Symptoms or Concerns     If patient has red-flag symptoms, warm transfer to triage line    Current symptom or concern: pt doing poorly; not walking as well, balance issues, turning his head difficult, pt is depressed, getting forgetful, pt has not taken his medication regularly and is out of his medication, pt not hungry can't eat, pt said he \"is shutting down.\"  (pt had been taking medical cannabis and tecfigera---out of both), pt's pharmacy is [facility] in [place], [place].    Symptoms have been present for:  two week(s)    Has patient previously been seen for this? Yes    By : Dr. Barbara Spain    Date: 7/23/20    Are there any new or worsening symptoms? Yes: pt feels like he should be in the hospital. Pt feels like he needs his power chair. Pt is a single parent of a 7 year old. This pt is needing help ASAP.  Pt mentioned that Dr. Spain was going to put him on a new medication?  Pt is unsure.  Also, recently, he has had a lot of dental work, drilling into old fillings (metal) pt thinks something having to do with the dental care is causing a lot of problems for him.  Pt is so fatigued, dealing with a lot of confusion, he does not feel like himself at all.  Pt is thinking he was supposed to have MRI of brain and spine for this provider?   Pt needs a call back from our team in Neurology ASAP to move things forward for him quickly. Thank you.      Action Taken: Message routed to:  [facility] ([facility]):  Neurology    Travel Screening: Not Applicable                                                                      "
"Spoke with Jovan.  Asked him to try to schedule his MRI before his appointment on 10/21.  He endorses extreme fatigue, dizziness, \"brain fog\" and \"fuzzy\" vision.  He has been in touch with his PCP as well. Reiterated that if any point he doesn't feel safe, he should go to the emergency department.    Pinky Stack RN      "
Appointment scheduled as requested.  left informing patient   Jyoti De León MA    
Left a VMM for the patient letting him know that Dr. Spain has availability on 10/21 at 3:30 and to please call back if he would like to schedule.    Pinky Stack RN    
[facility] Call Center    Phone Message    May a detailed message be left on voicemail: yes     Reason for Call: Other: Pt returning a call from Nurse for an appt. Writer tried scheduling appt but first available for Dr. Spain won't be until 11/11. Pt states that the 10/21 appt at 3:30 would work for him.     Please advise and call patient back to schedule    Action Taken: Other:  MS    Travel Screening: Not Applicable                                                                      
normal (ped)...

## 2022-04-05 ENCOUNTER — HOSPITAL ENCOUNTER (OUTPATIENT)
Dept: PHYSICAL THERAPY | Facility: CLINIC | Age: 42
Setting detail: THERAPIES SERIES
Discharge: HOME | End: 2022-04-05
Attending: PSYCHIATRY & NEUROLOGY
Payer: COMMERCIAL

## 2022-04-05 PROCEDURE — 97110 THERAPEUTIC EXERCISES: CPT | Mod: GP | Performed by: PHYSICAL THERAPIST

## 2022-04-05 NOTE — PROGRESS NOTES
[facility]    OUTPATIENT PHYSICAL THERAPY  PLAN OF TREATMENT FOR OUTPATIENT REHABILITATION AND PROGRESS NOTE           Patient's Last Name, First Name, Jovan Mcgarry Date of Birth  1980   Provider's Name  [facility] Medical Record No.  [id number]    Onset Date  9/9/2021 Start of Care Date  10/19/2021   Type:     _X_PT   ___OT   ___SLP Medical Diagnosis  MS   PT Diagnosis  Neck pain Plan of Treatment  Frequency/Duration: 1x month x 90 days  Certification date from 3/18/2022 to 6/15/2022     Goals:  Goal Identifier Neck pain   Goal Description Jovan will report no neck pain with sitting and performing computer work for 45 minute intervlas for 1 week.   Target Date 06/15/21   Date Met   4/5/2022   Progress (detail required for progress note):  Denies neck pain with ability to sit at laptop for 45 minute intervals on a daily basis.     Goal Identifier driving   Goal Description Jovan will report increased ease of turning his head to the left when drivign for changing lanes on a daily basis.   Target Date 06/15/21   Date Met   4/5/2022   Progress (detail required for progress note):  Increased ease with turning head while driving.     Goal Identifier Sitting posture   Goal Description Jovan will demonstrate the ability to sit with upright posture for 30 minute intervals to assist his daughter with school on a daily basis.   Target Date 06/15/22   Date Met   4/5/2022   Progress (detail required for progress note):  He is luz to demonstrate improved sitting posture and reports ability to sit for 30 minutes with no neck pain.       Beginning/End Dates of Progress Note Reporting Period:  1/18/2022 to 4/5/2022    Progress Toward Goals:   Progress this reporting period: Good progress towards goals and will discharge today with plan to continue with home  program for strengthening and lengthening of postural muscles and continue accupuncture and chiropractic care.    Client Self (Subjective) Report for Progress Note Reporting Period: He has been doing accupuncture regularly and worked through left deltoid pain. An additional order was received because he had to cancel appts this Winter due to having left deltoid pain. No change to plan of care and current goals remain appropriate with extended date as appropriate.      Objective Measurements:   Able to sit for 30 minute intervals and work at lap top of 45 minutes with no neck pain. Discharge PT with plan to continue with home program.                                             I CERTIFY THE NEED FOR THESE SERVICES FURNISHED UNDER        THIS PLAN OF TREATMENT AND WHILE UNDER MY CARE     (Physician co-signature of this document indicates review and certification of the therapy plan).                Referring Provider: Benjamin Cee MD due to Barbara Spain no longer a provider of his.      Physician signature:_____________________________________________    Abigail Mane PT

## 2022-04-11 ENCOUNTER — ANCILLARY PROCEDURE (OUTPATIENT)
Dept: MRI IMAGING | Facility: CLINIC | Age: 42
End: 2022-04-11
Attending: PSYCHIATRY & NEUROLOGY
Payer: COMMERCIAL

## 2022-04-11 DIAGNOSIS — G35 MULTIPLE SCLEROSIS (H): ICD-10-CM

## 2022-04-11 PROCEDURE — 72141 MRI NECK SPINE W/O DYE: CPT | Mod: GC | Performed by: RADIOLOGY

## 2022-04-11 PROCEDURE — 72146 MRI CHEST SPINE W/O DYE: CPT | Mod: GC | Performed by: RADIOLOGY

## 2022-04-11 PROCEDURE — 70551 MRI BRAIN STEM W/O DYE: CPT | Mod: GC | Performed by: RADIOLOGY

## 2022-05-26 DIAGNOSIS — G35 MULTIPLE SCLEROSIS (H): ICD-10-CM

## 2022-05-26 NOTE — TELEPHONE ENCOUNTER
Received refill request for DALFAMPRIDINE from [facility]; Patient was last seen on 1/27/2022 and has follow up appointment on 11/7/2022 with Stefano in [place]. Pended to [facility] for review/approval      This patient was previously seeing Dr Spain. He recently has MRI scans done on 4/11/2022. He is schedule to see Dr Alatorre in November 2022 in  but, in the meantime can someone look at his MRI scans to make sure their is no new findings and reach out to patient if possible    Jyoti De León MA

## 2022-05-27 RX ORDER — DALFAMPRIDINE 10 MG/1
TABLET, FILM COATED, EXTENDED RELEASE ORAL
Qty: 60 TABLET | Refills: 11 | Status: SHIPPED | OUTPATIENT
Start: 2022-05-27 | End: 2023-04-04

## 2022-05-31 ENCOUNTER — TELEPHONE (OUTPATIENT)
Dept: NEUROLOGY | Facility: CLINIC | Age: 42
End: 2022-05-31
Payer: COMMERCIAL

## 2022-05-31 NOTE — TELEPHONE ENCOUNTER
Former pt of Dr Spain with appointment scheduled to establish care with Dr Agarwal at [facility] in November. Pt had MRI scans done under Dr Spain's order in April and had questions regarding MRI results. Called pt and informed him that Dr Agarwal has reviewed his MRIs and the scans are stable, no new lesions and no evidence of active inflammation. Also reviewed appointment date and time with pt, and that appointment will be at [facility].    Gabbie Metzger RN

## 2022-06-27 ENCOUNTER — TELEPHONE (OUTPATIENT)
Dept: NEUROLOGY | Facility: CLINIC | Age: 42
End: 2022-06-27

## 2022-06-27 NOTE — TELEPHONE ENCOUNTER
PA Initiation    Medication: Dalfampridine ER 10MG tablets ([place] PENDING)  Insurance Company: [employer] - Phone [phone] Fax [phone]  Pharmacy Filling the Rx: [place], [place] - [address] [place] [address]  Filling Pharmacy Phone:    Filling Pharmacy Fax:    Start Date: 6/27/2022        Thank you,    Corrina Chaudhari [facility]  Specialty Pharmacy Clinic Liaison - CardiologyNeurologyMultiple [facility] [address]  [address] [place] [address]  Ph: [phone] Fax: [phone]  [email or web address]

## 2022-06-28 NOTE — TELEPHONE ENCOUNTER
[facility] Call Center    Phone Message    May a detailed message be left on voicemail: yes     Reason for Call: Medication Question or concern regarding medication   Prescription Clarification  Name of Medication: Dalfampridine 10 MG TB12  Prescribing Provider: Dr. Agarwal   Pharmacy: NA   What on the order needs clarification? Tori tabor [employer] requests call back at [phone] to finalize prior authorization for medication. She says this prior auth will  on 22. Case ID [id number]          Action Taken: Message routed to:  Other: [facility]    Travel Screening: Not Applicable

## 2022-06-29 NOTE — TELEPHONE ENCOUNTER
[employer] denied Dalfampridine coverage request for a reason that does not apply to our coverage request. [employer] stated the patient must try/fail preferred alternatives: Aubagio and Gilenya. I explained that those medications are NOT alternatives to Dalfampridine and requested they re-review our request.         Thank you,    Corrina Chaudhari [facility]  Specialty Pharmacy Clinic Liaison - CardiologyNeurologyMultGrand [facility] [address] [place] [address]  Ph: [phone] Fax: [phone]  [email or web address]

## 2022-07-06 NOTE — TELEPHONE ENCOUNTER
Prior Authorization Approval    Authorization Effective Date: 6/27/2022  Authorization Expiration Date: 6/27/2023  Medication: Dalfampridine ER 10MG tablets ([place] APPROV[place])  Approved Dose/Quantity: 60/30 days  Reference #: CMM KEY: [id number]   Insurance Company: [employer] - Phone [phone] Fax [phone]  Expected CoPay:       CoPay Card Available: No    Foundation Assistance Needed:    Which Pharmacy is filling the prescription (Not needed for infusion/clinic administered): [address]  Pharmacy Notified:    Patient Notified:        Thank you,    Corrina Chaudhari [facility]  Specialty Pharmacy Clinic Liaison - CardiologyNeurologyMultiple Sclerosis  [facility]  [address] [place] [address]  Ph: [phone] Fax: [phone]  [email or web address]

## 2022-08-19 NOTE — TELEPHONE ENCOUNTER
RECORDS RECEIVED FROM: Internal   REASON FOR VISIT: MS   Date of Appt: 11/07/2022   NOTES (FOR ALL VISITS) STATUS DETAILS   OFFICE NOTE from referring provider N/A    OFFICE NOTE from other specialist Internal 01/27/2022 Dr Spain [facility]    DISCHARGE SUMMARY from hospital N/A    DISCHARGE REPORT from the ER N/A    OPERATIVE REPORT N/A    MEDICATION LIST N/A    IMAGING  (FOR ALL VISITS)     EMG N/A    EEG N/A    LUMBAR PUNCTURE N/A    VICTORIANO SCAN N/A    ULTRASOUND (CAROTID BILAT) *VASCULAR* N/A    MRI (HEAD, NECK, SPINE) N/A    CT (HEAD, NECK, SPINE) N/A

## 2022-09-16 DIAGNOSIS — G35 MULTIPLE SCLEROSIS (H): ICD-10-CM

## 2022-09-16 RX ORDER — DIMETHYL FUMARATE 240 MG/1
240 CAPSULE ORAL 2 TIMES DAILY
Qty: 60 CAPSULE | Refills: 11 | Status: SHIPPED | OUTPATIENT
Start: 2022-09-16 | End: 2023-04-04

## 2022-09-16 NOTE — TELEPHONE ENCOUNTER
Pending Prescriptions:                       Disp   Refills    dimethyl fumarate 240 MG CPDR             60 cap*11           Sig: Take 1 capsule (240 mg) by mouth 2 times daily      [facility]ed Pharmacy[facility]    Pt's last office visit: 01/27/2022  Next scheduled office visit: 11/07/2022      Per the RN/LPN medication refill protocol, writer is unable to refill this request.     Tran Gottlieb LPN

## 2022-11-07 ENCOUNTER — OFFICE VISIT (OUTPATIENT)
Dept: NEUROLOGY | Facility: CLINIC | Age: 42
End: 2022-11-07
Payer: COMMERCIAL

## 2022-11-07 ENCOUNTER — PRE VISIT (OUTPATIENT)
Dept: NEUROLOGY | Facility: CLINIC | Age: 42
End: 2022-11-07

## 2022-11-07 VITALS
DIASTOLIC BLOOD PRESSURE: 74 MMHG | WEIGHT: 185 LBS | SYSTOLIC BLOOD PRESSURE: 129 MMHG | BODY MASS INDEX: 25.09 KG/M2 | HEART RATE: 62 BPM

## 2022-11-07 DIAGNOSIS — R53.82 CHRONIC FATIGUE: ICD-10-CM

## 2022-11-07 DIAGNOSIS — G47.8 SLEEP RELATED EATING DISORDER: ICD-10-CM

## 2022-11-07 DIAGNOSIS — G35 MS (MULTIPLE SCLEROSIS) (H): Primary | ICD-10-CM

## 2022-11-07 LAB
ALBUMIN SERPL-MCNC: 4.5 G/DL (ref 3.4–5)
ALP SERPL-CCNC: 40 U/L (ref 40–150)
ALT SERPL W P-5'-P-CCNC: 88 U/L (ref 0–70)
AST SERPL W P-5'-P-CCNC: 49 U/L (ref 0–45)
BASOPHILS # BLD AUTO: 0.1 10E3/UL (ref 0–0.2)
BASOPHILS NFR BLD AUTO: 1 %
BILIRUB DIRECT SERPL-MCNC: 0.1 MG/DL (ref 0–0.2)
BILIRUB SERPL-MCNC: 0.4 MG/DL (ref 0.2–1.3)
EOSINOPHIL # BLD AUTO: 0.2 10E3/UL (ref 0–0.7)
EOSINOPHIL NFR BLD AUTO: 2 %
ERYTHROCYTE [DISTWIDTH] IN BLOOD BY AUTOMATED COUNT: 11.9 % (ref 10–15)
HCT VFR BLD AUTO: 39.7 % (ref 35–53)
HGB BLD-MCNC: 13.7 G/DL (ref 11.7–17.7)
IMM GRANULOCYTES # BLD: 0 10E3/UL
IMM GRANULOCYTES NFR BLD: 0 %
LYMPHOCYTES # BLD AUTO: 1.3 10E3/UL (ref 0.8–5.3)
LYMPHOCYTES NFR BLD AUTO: 15 %
MCH RBC QN AUTO: 30.6 PG (ref 26.5–33)
MCHC RBC AUTO-ENTMCNC: 34.5 G/DL (ref 31.5–36.5)
MCV RBC AUTO: 89 FL (ref 78–100)
MONOCYTES # BLD AUTO: 0.6 10E3/UL (ref 0–1.3)
MONOCYTES NFR BLD AUTO: 6 %
NEUTROPHILS # BLD AUTO: 6.5 10E3/UL (ref 1.6–8.3)
NEUTROPHILS NFR BLD AUTO: 76 %
NRBC # BLD AUTO: 0 10E3/UL
NRBC BLD AUTO-RTO: 0 /100
PLATELET # BLD AUTO: 226 10E3/UL (ref 150–450)
PROT SERPL-MCNC: 7.2 G/DL (ref 6.8–8.8)
RBC # BLD AUTO: 4.47 10E6/UL (ref 3.8–5.9)
WBC # BLD AUTO: 8.6 10E3/UL (ref 4–11)

## 2022-11-07 PROCEDURE — 80076 HEPATIC FUNCTION PANEL: CPT | Performed by: PSYCHIATRY & NEUROLOGY

## 2022-11-07 PROCEDURE — 85025 COMPLETE CBC W/AUTO DIFF WBC: CPT | Performed by: PSYCHIATRY & NEUROLOGY

## 2022-11-07 PROCEDURE — 36415 COLL VENOUS BLD VENIPUNCTURE: CPT | Performed by: PSYCHIATRY & NEUROLOGY

## 2022-11-07 PROCEDURE — 82306 VITAMIN D 25 HYDROXY: CPT | Performed by: PSYCHIATRY & NEUROLOGY

## 2022-11-07 PROCEDURE — 99214 OFFICE O/P EST MOD 30 MIN: CPT | Mod: GC | Performed by: PSYCHIATRY & NEUROLOGY

## 2022-11-07 RX ORDER — MODAFINIL 200 MG/1
TABLET ORAL
Qty: 60 TABLET | Refills: 5 | Status: SHIPPED | OUTPATIENT
Start: 2022-11-07 | End: 2022-11-28

## 2022-11-07 NOTE — PATIENT INSTRUCTIONS
I do think that you can try taking modafinil 200 mg twice daily as needed for fatigue. Do not take the second dose after 2 pm as this will interfere with sleep.    2. Continue Tecfidera (dimethyl fumarate) and dalfampridine    3.  We will refer you to sleep medicine for evaluation and management of suspected sleep disorder    4. Blood tests today

## 2022-11-07 NOTE — LETTER
11/7/2022      RE: Jovan Cohen  [address]  [address]  [place] [facility] [address]     Multiple Sclerosis Clinic Visit  11/07/2022    Reason: Multiple Sclerosis     Source of information: Patient and chart review    History of Present Symptom:  Jovan Cohen is a 42 year old adult with a PMH significant for relapsing remitting multiple sclerosis who presents today to establish care. He previously followed with Dr. Spain who has since left our practice.      On a day to day basis he has significant fatigue. The modafinil is helpful but it is not enough, he consistently takes a multi hour nap daily. He is on disability. He is a father to a 10 year old girl and spend his time taking care of her. Her mother is out of the picture. He has been getting muscle cramps and his hands will sometimes tighten up. He has been unable to play guitar or piano. He also get significant eye strain from vertigo. He is able to drive but things like reading are difficult.     Disease onset: At age 18 he had pain in fingertips. In 2002 he had vertigo with vertical nystagmus. He was not diagnosed until 2004 when he developed unsteady walking and double vision.   Previous disease modifying therapy:   Copaxone 2004 -2011   Gilenya   Tecfidera started around 2015   He does get flushing but it is manageable.     Symptom management:  Baclofen 30/20/20 mg, prescribed by Dr Cee.    Dalfampridine 10 mg two times daily, he does endorse benefit from this.   Gait: He was using a wheelchair 5-6 years ago. He has a cane but does not need it recently. He has AFOs but doesn't use them. He thinks he could walk about one football field before his legs would develop cramps and then his balance would become poor.     Taking 3 norco daily for chronic back pain.     The patient's medical, surgical, social, and family history were personally reviewed with the patient.    Social History     Tobacco Use     Smoking status: Never     Smokeless tobacco: Never    Substance Use Topics     Alcohol use: Not Currently     Drug use: Not Currently     No family history on file.  Current Outpatient Medications   Medication     albuterol (PROAIR HFA/PROVENTIL HFA/VENTOLIN HFA) 108 (90 Base) MCG/ACT inhaler     baclofen (LIORESAL) 10 MG tablet     Dalfampridine 10 MG TB12     dimethyl fumarate 240 MG CPDR     HYDROcodone-acetaminophen (NORCO) 5-325 MG tablet     modafinil (PROVIGIL) 200 MG tablet     WIXELA INHUB 250-50 MCG/DOSE inhaler     aspirin (ASA) 81 MG EC tablet     cholecalciferol (VITAMIN D3) 125 mcg (5000 units) capsule     ibuprofen (ADVIL/MOTRIN) 200 MG tablet     nortriptyline (PAMELOR) 10 MG capsule     No current facility-administered medications for this visit.     Allergies   Allergen Reactions     Latex Anaphylaxis     Probable agent thought to have caused  The reaction.        Fingolimod Other (See Comments) and Palpitations     Chest pain hx.            Review of Systems:  14-point review of systems was completed. The pertinent positives and negatives are in the HPI.    Physical Examination   Vitals: /74 (BP Location: Right arm, Patient Position: Sitting, Cuff Size: Adult Regular)   Pulse 62   Wt 83.9 kg (185 lb)   BMI 25.09 kg/m    General: Patient appears comfortable in no acute distress.   HEENT: NC/AT, no icterus, moist mucous membranes  Chest: non-labored on RA  Extremities: Warm, no edema  Skin: No rash or lesion   Psych: Affect appropriate for situation   Neuro:  Mental status: Awake, alert, attentive. Language is fluent but slow to respond.   Cranial nerves: PERRL with no relative afferent pupillary defect, conjugate gaze, EOMI, visual fields intact, face symmetric, shoulder shrug strong, tongue protrusion/uvula midline, no dysarthria.   Motor: Normal muscle bulk and tone. No abnormal movements. 5/5 strength in 4/4 extremities.     R L  Deltoid  5 5  Biceps  5 5  Triceps 5 5  Wrist ext 5 5  Finger abd 5 5-    Hip flexion 5 5  Knee  flexion 5 5  Knee ext 5 5  Dorsiflexion 5 5    Reflexes: 2+ reflexes symmetric in biceps, brachioradialis, patellae, and achilles. No clonus, toes down-going.  Sensory: Intact to light touch, reduced vibration at the toes symmetric. Romberg is negative.   Coordination: FNF and HS without ataxia or dysmetria.    Gait: Normal width, stride length, turn, with symmetric arm swing. Tandem walk intact.    Laboratory:  CBC RESULTS: Recent Labs   Lab Test 11/07/22  1344   WBC 8.6   RBC 4.47   HGB 13.7   HCT 39.7   MCV 89   MCH 30.6   MCHC 34.5   RDW 11.9        Imaging:    MRI brain C/T spine 4/11/2022    IMPRESSION:   1. The study demonstrates greater than 20 foci of T2-hyperintensity  within the cerebral white matter consistent with the clinical  suspicion of demyelinating disease. There are no foci of abnormal  enhancement noted intracranially .   2. There is no significant interval change from the prior study.    IMPRESSION:  1. Not significantly changed C7-T1 focus of T2 hyperintensity in the  central cord compatible with demyelinating disease.  2. No evidence of demyelinating disease in the thoracic spine below  C7-T1.  3. Progressed cervical spondylosis greatest at C5-6 with severe left  foraminal stenosis.    Assessment/Plan:  Jovan Cohen is a 42 year old adult who presents for follow up for relapsing remitting multiple sclerosis with previous nystagmus and gait instability as well as mild cognitive impairment.     He remains stable clinically and radiographicaly on Tecfidera. He did have a new lesion in MRI of 2020 which was during a time when he was unable to get Tecfidera due to insurance difficulty.     He struggles significantly from fatigue despite modafinil 100 mg twice daily. He did try taking 200 mg on his own and had more benefit.     He also endorsed getting up at night to eat from sleep and having little recollection of the event in the morning. This has been happening fairly regularly.      # Multiple sclerosis   # mild cognitive impairment   # fatigue   -continue tecfidera   -increase modafinil to 200 mg twice daily as needed   -blood work monitoring CBC, liver panel, and vit D today     # Possible sleep related eating disorder   -referral for sleep medicine     Patient seen and discussed with Dr. Agarwal.  I have reviewed the plan with the patient, who is in agreement.    Pinky George DO  Multiple Sclerosis Fellow     Attending physician: I saw and evaluated the patient with Dr. George and I agree with her findings and plan of care as documented above.    Patient with MS, well controlled on dimethyl fumarate, and will check routine laboratory studies for monitoring as outlined above.    He describes an unusual history suggestive of possible sleep eating, not on any hypnotic medications such as zolpidem, etc. We will refer him to sleep medicine for further evaluation.    Other issues as per above.    Ubaldo Agarwal MD   of Neurology  [facility]    Diagnoses:    1) Multiple sclerosis  2) Chronic fatigue  3) Sleep related eating disorder    Cc:  Benjamin Cee MD (PCP)  Patient

## 2022-11-07 NOTE — PROGRESS NOTES
Multiple Sclerosis Clinic Visit  11/07/2022    Reason: Multiple Sclerosis     Source of information: Patient and chart review    History of Present Symptom:  Jovan Cohen is a 42 year old adult with a PMH significant for relapsing remitting multiple sclerosis who presents today to establish care. He previously followed with Dr. Spain who has since left our practice.      On a day to day basis he has significant fatigue. The modafinil is helpful but it is not enough, he consistently takes a multi hour nap daily. He is on disability. He is a father to a 10 year old girl and spend his time taking care of her. Her mother is out of the picture. He has been getting muscle cramps and his hands will sometimes tighten up. He has been unable to play guitar or piano. He also get significant eye strain from vertigo. He is able to drive but things like reading are difficult.     Disease onset: At age 18 he had pain in fingertips. In 2002 he had vertigo with vertical nystagmus. He was not diagnosed until 2004 when he developed unsteady walking and double vision.   Previous disease modifying therapy:   Copaxone 2004 -2011   Gilenya   Tecfidera started around 2015   He does get flushing but it is manageable.     Symptom management:  Baclofen 30/20/20 mg, prescribed by Dr Cee.    Dalfampridine 10 mg two times daily, he does endorse benefit from this.   Gait: He was using a wheelchair 5-6 years ago. He has a cane but does not need it recently. He has AFOs but doesn't use them. He thinks he could walk about one football field before his legs would develop cramps and then his balance would become poor.     Taking 3 norco daily for chronic back pain.     The patient's medical, surgical, social, and family history were personally reviewed with the patient.    Social History     Tobacco Use    Smoking status: Never    Smokeless tobacco: Never   Substance Use Topics    Alcohol use: Not Currently    Drug use: Not Currently     No  family history on file.  Current Outpatient Medications   Medication    albuterol (PROAIR HFA/PROVENTIL HFA/VENTOLIN HFA) 108 (90 Base) MCG/ACT inhaler    baclofen (LIORESAL) 10 MG tablet    Dalfampridine 10 MG TB12    dimethyl fumarate 240 MG CPDR    HYDROcodone-acetaminophen (NORCO) 5-325 MG tablet    modafinil (PROVIGIL) 200 MG tablet    WIXELA INHUB 250-50 MCG/DOSE inhaler    aspirin (ASA) 81 MG EC tablet    cholecalciferol (VITAMIN D3) 125 mcg (5000 units) capsule    ibuprofen (ADVIL/MOTRIN) 200 MG tablet    nortriptyline (PAMELOR) 10 MG capsule     No current facility-administered medications for this visit.     Allergies   Allergen Reactions    Latex Anaphylaxis     Probable agent thought to have caused  The reaction.       Fingolimod Other (See Comments) and Palpitations     Chest pain hx.            Review of Systems:  14-point review of systems was completed. The pertinent positives and negatives are in the HPI.    Physical Examination   Vitals: /74 (BP Location: Right arm, Patient Position: Sitting, Cuff Size: Adult Regular)   Pulse 62   Wt 83.9 kg (185 lb)   BMI 25.09 kg/m    General: Patient appears comfortable in no acute distress.   HEENT: NC/AT, no icterus, moist mucous membranes  Chest: non-labored on RA  Extremities: Warm, no edema  Skin: No rash or lesion   Psych: Affect appropriate for situation   Neuro:  Mental status: Awake, alert, attentive. Language is fluent but slow to respond.   Cranial nerves: PERRL with no relative afferent pupillary defect, conjugate gaze, EOMI, visual fields intact, face symmetric, shoulder shrug strong, tongue protrusion/uvula midline, no dysarthria.   Motor: Normal muscle bulk and tone. No abnormal movements. 5/5 strength in 4/4 extremities.     R L  Deltoid  5 5  Biceps  5 5  Triceps  5 5  Wrist ext 5 5  Finger abd 5 5-    Hip flexion 5 5  Knee flexion 5 5  Knee ext 5 5  Dorsiflexion 5 5    Reflexes: 2+ reflexes symmetric in biceps, brachioradialis,  patellae, and achilles. No clonus, toes down-going.  Sensory: Intact to light touch, reduced vibration at the toes symmetric. Romberg is negative.   Coordination: FNF and HS without ataxia or dysmetria.    Gait: Normal width, stride length, turn, with symmetric arm swing. Tandem walk intact.    Laboratory:  CBC RESULTS: Recent Labs   Lab Test 11/07/22  1344   WBC 8.6   RBC 4.47   HGB 13.7   HCT 39.7   MCV 89   MCH 30.6   MCHC 34.5   RDW 11.9        Imaging:    MRI brain C/T spine 4/11/2022    IMPRESSION:   1. The study demonstrates greater than 20 foci of T2-hyperintensity  within the cerebral white matter consistent with the clinical  suspicion of demyelinating disease. There are no foci of abnormal  enhancement noted intracranially .   2. There is no significant interval change from the prior study.    IMPRESSION:  1. Not significantly changed C7-T1 focus of T2 hyperintensity in the  central cord compatible with demyelinating disease.  2. No evidence of demyelinating disease in the thoracic spine below  C7-T1.  3. Progressed cervical spondylosis greatest at C5-6 with severe left  foraminal stenosis.    Assessment/Plan:  Jovan Cohen is a 42 year old adult who presents for follow up for relapsing remitting multiple sclerosis with previous nystagmus and gait instability as well as mild cognitive impairment.     He remains stable clinically and radiographicaly on Tecfidera. He did have a new lesion in MRI of 2020 which was during a time when he was unable to get Tecfidera due to insurance difficulty.     He struggles significantly from fatigue despite modafinil 100 mg twice daily. He did try taking 200 mg on his own and had more benefit.     He also endorsed getting up at night to eat from sleep and having little recollection of the event in the morning. This has been happening fairly regularly.     # Multiple sclerosis   # mild cognitive impairment   # fatigue   -continue tecfidera   -increase modafinil  to 200 mg twice daily as needed   -blood work monitoring CBC, liver panel, and vit D today     # Possible sleep related eating disorder   -referral for sleep medicine     Patient seen and discussed with Dr. Agarwal.  I have reviewed the plan with the patient, who is in agreement.      Pinky George DO  Multiple Sclerosis Fellow     Attending physician: I saw and evaluated the patient with Dr. George and I agree with her findings and plan of care as documented above.    Patient with MS, well controlled on dimethyl fumarate, and will check routine laboratory studies for monitoring as outlined above.    He describes an unusual history suggestive of possible sleep eating, not on any hypnotic medications such as zolpidem, etc. We will refer him to sleep medicine for further evaluation.    Other issues as per above.    Ubaldo Agarwal MD   of Neurology  [facility]    Diagnoses:    1) Multiple sclerosis  2) Chronic fatigue  3) Sleep related eating disorder

## 2022-11-07 NOTE — Clinical Note
11/7/2022         RE: Jovan Cohen  [address]  [address]  [facility] [address]        Dear Colleague,    Thank you for referring your patient, Jovan Cohen, to the [facility] [place]. Please see a copy of my visit note below.    Multiple Sclerosis Clinic Visit  11/07/2022    Reason: Multiple Sclerosis     Source of information: Patient and chart review    History of Present Symptom:  Jovan Cohen is a 42 year old adult with a PMH significant for relapsing remitting multiple sclerosis who presents today to establish care. He previously followed with Dr. Spain who has since left our practice.      On a day to day basis he has significant fatigue. The modafinil is helpful but it is not enough, he consistently takes a multi hour nap daily. He is on disability. He is a father to a 10 year old girl and spend his time taking care of her. Her mother is out of the picture. He has been getting muscle cramps and his hands will sometimes tighten up. He has been unable to play guitar or piano. He also get significant eye strain from vertigo. He is able to drive but things like reading are difficult.     Disease onset: At age 18 he had pain in fingertips. In 2002 he had vertigo with vertical nystagmus. He was not diagnosed until 2004 when he developed unsteady walking and double vision.   Previous disease modifying therapy:   Copaxone 2004 -2011   Gilenya   Tecfidera started around 2015   He does get flushing but it is manageable.     Symptom management:  Baclofen 30/20/20 mg, prescribed by Dr Cee.    Dalfampridine 10 mg two times daily, he does endorse benefit from this.   Gait: He was using a wheelchair 5-6 years ago. He has a cane but does not need it recently. He has AFOs but doesn't use them. He thinks he could walk about one football field before his legs would develop cramps and then his balance would become poor.     Taking 3 norco daily for chronic back pain.     The  patient's medical, surgical, social, and family history were personally reviewed with the patient.    Social History     Tobacco Use    Smoking status: Never    Smokeless tobacco: Never   Substance Use Topics    Alcohol use: Not Currently    Drug use: Not Currently     No family history on file.  Current Outpatient Medications   Medication    albuterol (PROAIR HFA/PROVENTIL HFA/VENTOLIN HFA) 108 (90 Base) MCG/ACT inhaler    baclofen (LIORESAL) 10 MG tablet    Dalfampridine 10 MG TB12    dimethyl fumarate 240 MG CPDR    HYDROcodone-acetaminophen (NORCO) 5-325 MG tablet    modafinil (PROVIGIL) 200 MG tablet    WIXELA INHUB 250-50 MCG/DOSE inhaler    aspirin (ASA) 81 MG EC tablet    cholecalciferol (VITAMIN D3) 125 mcg (5000 units) capsule    ibuprofen (ADVIL/MOTRIN) 200 MG tablet    nortriptyline (PAMELOR) 10 MG capsule     No current facility-administered medications for this visit.     Allergies   Allergen Reactions    Latex Anaphylaxis     Probable agent thought to have caused  The reaction.       Fingolimod Other (See Comments) and Palpitations     Chest pain hx.            Review of Systems:  14-point review of systems was completed. The pertinent positives and negatives are in the HPI.    Physical Examination   Vitals: /74 (BP Location: Right arm, Patient Position: Sitting, Cuff Size: Adult Regular)   Pulse 62   Wt 83.9 kg (185 lb)   BMI 25.09 kg/m    General: Patient appears comfortable in no acute distress.   HEENT: NC/AT, no icterus, moist mucous membranes  Chest: non-labored on RA  Extremities: Warm, no edema  Skin: No rash or lesion   Psych: Affect appropriate for situation   Neuro:  Mental status: Awake, alert, attentive. Language is fluent but slow to respond.   Cranial nerves: PERRL with no relative afferent pupillary defect, conjugate gaze, EOMI, visual fields intact, face symmetric, shoulder shrug strong, tongue protrusion/uvula midline, no dysarthria.   Motor: Normal muscle bulk and tone. No  abnormal movements. 5/5 strength in 4/4 extremities.     R L  Deltoid  5 5  Biceps  5 5  Triceps  5 5  Wrist ext 5 5  Finger abd 5 5-    Hip flexion 5 5  Knee flexion 5 5  Knee ext 5 5  Dorsiflexion 5 5    Reflexes: 2+ reflexes symmetric in biceps, brachioradialis, patellae, and achilles. No clonus, toes down-going.  Sensory: Intact to light touch, reduced vibration at the toes symmetric. Romberg is negative.   Coordination: FNF and HS without ataxia or dysmetria.    Gait: Normal width, stride length, turn, with symmetric arm swing. Tandem walk intact.    Laboratory:  CBC RESULTS: Recent Labs   Lab Test 11/07/22  1344   WBC 8.6   RBC 4.47   HGB 13.7   HCT 39.7   MCV 89   MCH 30.6   MCHC 34.5   RDW 11.9        Imaging:    MRI brain C/T spine 4/11/2022    IMPRESSION:   1. The study demonstrates greater than 20 foci of T2-hyperintensity  within the cerebral white matter consistent with the clinical  suspicion of demyelinating disease. There are no foci of abnormal  enhancement noted intracranially .   2. There is no significant interval change from the prior study.    IMPRESSION:  1. Not significantly changed C7-T1 focus of T2 hyperintensity in the  central cord compatible with demyelinating disease.  2. No evidence of demyelinating disease in the thoracic spine below  C7-T1.  3. Progressed cervical spondylosis greatest at C5-6 with severe left  foraminal stenosis.    Assessment/Plan:  Jovan Cohen is a 42 year old adult who presents for follow up for relapsing remitting multiple sclerosis with previous nystagmus and gait instability as well as mild cognitive impairment.     He remains stable clinically and radiographicaly on Tecfidera. He did have a new lesion in MRI of 2020 which was during a time when he was unable to get Tecfidera due to insurance difficulty.     He struggles significantly from fatigue despite modafinil 100 mg twice daily. He did try taking 200 mg on his own and had more benefit.     He  also endorsed getting up at night to eat from sleep and having little recollection of the event in the morning. This has been happening fairly regularly.     # Multiple sclerosis   # mild cognitive impairment   # fatigue   -continue tecfidera   -increase modafinil to 200 mg twice daily as needed   -blood work monitoring CBC, liver panel, and vit D today     # Possible sleep related eating disorder   -referral for sleep medicine     Patient seen and discussed with Dr. Agarwal.  I have reviewed the plan with the patient, who is in agreement.      Pinky George DO  Multiple Sclerosis Fellow     Attending physician: I saw and evaluated the patient with Dr. George and I agree with her findings and plan of care as documented above.    Patient with MS, well controlled on dimethyl fumarate, and will check routine laboratory studies for monitoring as outlined above.    He describes an unusual history suggestive of possible sleep eating, not on any hypnotic medications such as zolpidem, etc. We will refer him to sleep medicine for further evaluation.    Other issues as per above.    Ubaldo Agarwal MD   of Neurology  [facility]    Diagnoses:    1) Multiple sclerosis  2) Chronic fatigue  3) Sleep related eating disorder              Again, thank you for allowing me to participate in the care of your patient.        Sincerely,        Ubaldo Agarwal MD

## 2022-11-08 LAB — DEPRECATED CALCIDIOL+CALCIFEROL SERPL-MC: 16 UG/L (ref 20–75)

## 2022-11-08 NOTE — TELEPHONE ENCOUNTER
Health Call Center    Phone Message    May a detailed message be left on voicemail: yes     Reason for Call: Symptoms or Concerns     If patient has red-flag symptoms, warm transfer to triage line    Current symptom or concern: numbness and tingling    Symptoms have been present for:  few month(s)    Has patient previously been seen for this? Yes    By : Barbara Spain      Are there any new or worsening symptoms? Yes: Patient calling stating that his left arm is numb and tingling more than before.  Patient has been going to chiropractor and acupuncture. Patient wants to let Dr. Spain know and maybe thinking he is having a flare up. Please call to discuss thank you.       Action Taken: Message routed to:  [facility] neurology    Travel Screening: Not Applicable                                                                         PEDIATRIC CRITICAL CARE UNIT  PICU TRANSFER NOTE    ADMISSION DATE:  10/28/2022  DATE:  11/8/2022  CURRENT HOSPITAL DAY:  Hospital Day: 12     ACTIVE PROBLEMS:    Active Hospital Problems    Diagnosis     HA (headache)     Acute bacterial sinusitis     Subdural empyema     Cerebritis     Thrombosis of superior sagittal sinus      Floor Course Summary:   After downgrade back to pediatric floors patient was noted to have developed left eye ptosis, erythema to the left temporal side of head, and bruising around left eye. Repeat MRI revealed increased size of subdural collections along the left anterior and middle cranial fossa as well as along the left cerebellar tentorium, consistent with subdural empyemas. Transferred back to PICU for close monitoring perioperatively. Made NPO and pre-operative labs were ordered.     INTERVAL CHANGES:   - Arrived from floor awake and alert  - MR yesterday concerning for increased size of subdural empyema, plan for repeat crani tomorrow 11/9  - No new concerns per parents, all questions were addressed by myself of neurosurgery team at bedside    MEDICATIONS:    Current Facility-Administered Medications   Medication Dose Route Frequency Provider Last Rate Last Admin    dextrose 5 % / sodium chloride 0.9% with KCl 20 mEq infusion   Intravenous Continuous Amany Corinne,  79 mL/hr at 11/08/22 0041 New Bag at 11/08/22 0041    levETIRAcetam ORAL (KepPRA) 100 MG/ML oral solution 1,200 mg  30 mg/kg (Dosing Weight) Oral 2 times per day Todd Sousa MD   1,200 mg at 11/08/22 0924    metroNIDAZOLE 50 MG/ML (compounded) oral suspension 395 mg  10 mg/kg (Dosing Weight) Oral TID Todd Sousa MD   395 mg at 11/08/22 0924    acetaminophen (TYLENOL) 160 MG/5ML solution 595.2 mg  15 mg/kg (Dosing Weight) Oral Q6H PRN Todd Sousa MD   595.2 mg at 11/07/22 2039    melatonin 1 MG/ML solution 3 mg  3 mg Oral Nightly Todd Sousa MD   3 mg at 11/07/22 2139    lacosamide ORAL (VIMPAT) 10 MG/ML  oral solution 100 mg  100 mg Oral 2 times per day Todd Sousa MD   100 mg at 22 0924    fosphenytoin PE (CEREBYX) 99 mg PE in NaCl 0.9% /peds IV syringe  2.5 mg PE/kg (Dosing Weight) Intravenous Q12H Pauline ROSEMARY OrrJorge, DO 39.6 mL/hr at 22 0041 99 mg PE at 22 0041    MIDazolam (VERSED) injection 2 mg  2 mg Intravenous Q2H PRN Kayli Borrego MD        calcium gluconate 2 g in dextrose 5 % 50 mL total volume IVPB  2 g Intravenous Q6H PRN Kayli Borrego MD 24.3 mL/hr at 10/29/22 0218 2 g at 10/29/22 0218    magnesium sulfate 2 g in 50 mL premix IVPB  2,000 mg Intravenous Q6H PRN Trey Sin MD        potassium CHLORIDE 20 mEq/100mL IVPB premix  20 mEq Intravenous Q6H PRN Kayli Borrego MD        fluticasone (FLONASE) 50 MCG/ACT nasal spray 2 spray  2 spray Each Nare Q12H Patricia Blanchard, DO   2 spray at 22 0924    morphine injection 1 mg  1 mg Intravenous Q2H PRN Khushi Mcknight DO   1 mg at 22 1344    cefTRIAXone (ROCEPHIN) syringe 2,000 mg  2,000 mg Intravenous Q12H Dany Reveles, DO   2,000 mg at 22 0603    lidocaine (ANECREAM/LMX) 4 % cream 1 application  1 application Topical PRN Khushi Mcknight DO        sodium chloride (PF) 0.9 % injection 0.5-1 mL  0.5-1 mL Intravenous Q6H Khushi Mcknight, DO   1 mL at 22 0924    And    sodium chloride (PF) 0.9 % injection 0.5-1 mL  0.5-1 mL Intravenous PRN Khushi Mcknight DO        oxymetazoline (AFRIN) 0.05 % nasal spray 3 spray  3 spray Each Nare TID PRN Khushi Mcknight DO        LORazepam (ATIVAN) injection 2 mg  2 mg Intravenous Q5 Min PRN Todd Sousa MD   2 mg at 22 0751    CARBOXYMethylcellulose (REFRESH TEARS) 0.5 % ophthalmic solution 1 drop  1 drop Both Eyes 4x Daily PRN Kayli Borrego MD              OBJECTIVE:      Vitals:  Vitals with min/max:      Vital Last Value 24 Hour Range   Temperature 98.8 °F (37.1 °C) (22 1158) Temp  Min: 97.8 °F (36.6 °C)  Max: 101.3 °F (38.5 °C)   Pulse  90 (11/08/22 1200) Pulse  Min: 86  Max: 100   Respiratory 20 (11/08/22 1200) Resp  Min: 16  Max: 20   Non-Invasive  Blood Pressure 98/60 (11/08/22 1158) BP  Min: 93/54  Max: 117/71   Pulse Oximetry 99 % (11/08/22 1158) SpO2  Min: 91 %  Max: 99 %   Arterial   Blood Pressure (!) 111/59 (10/29/22 2200) No data recorded        INTAKE/OUTPUT:      Intake/Output Summary (Last 24 hours) at 11/8/2022 1254  Last data filed at 11/8/2022 1159  Gross per 24 hour   Intake 2376 ml   Output 895 ml   Net 1481 ml         PHYSICAL EXAM:    Constitutional:       General: He is not in acute distress.     Appearance: He is not toxic-appearing.   HENT:      Head:      Comments: Bifrontotemporoparietal surgical incision. Stapled, opened to air, site is c/d/i without erythema or drainage.      Mouth/Throat:      Mouth: Mucous membranes are moist.      Pharynx: Oropharynx is clear.      Neck: Normal range of motion and neck supple.   Eyes:      Extraocular Movements: Extraocular movements intact. Mild ptosis of the left eye     Conjunctiva/sclera: Conjunctivae normal.      Pupils: Pupils are equal, round, and reactive to light.   Cardiovascular:      Rate and Rhythm: Normal rate and regular rhythm.      Pulses: Normal pulses.      Heart sounds: No murmur heard.    No friction rub. No gallop.   Pulmonary:      Effort: Pulmonary effort is normal. No retractions.      Breath sounds: Normal breath sounds. No decreased air movement. No wheezing.   Abdominal:      General: Abdomen is flat.      Palpations: Abdomen is soft. There is no mass.      Tenderness: There is no abdominal tenderness. There is no guarding.   Musculoskeletal:         General: Normal range of motion.   Lymphadenopathy:      Cervical: No cervical adenopathy.   Skin:     General: Skin is warm and dry.      Capillary Refill: Capillary refill takes 2 seconds   Neurological:      General: No focal deficit present.      Mental Status: He is alert and oriented for age. Mental status  is at baseline.      Cranial Nerves: No cranial nerve deficit.      Sensory: Sensation is intact. No sensory deficit.      Motor: Motor function is intact. No weakness.      Coordination: Coordination is intact. Coordination normal.   Psychiatric:         Mood and Affect: Mood normal.         Behavior: Behavior normal.         Thought Content: Thought content normal.         Judgment: Judgment normal.    LABORATORY DATA:    No results displayed because visit has over 200 results.           IMAGING STUDIES:    MRI BRAIN W WO CONTRAST   Final Result      Compared to 10/29/2022 MRI exam, increased size of rim-enhancing subdural   collections along the left anterior and middle cranial fossa as well as   along the left cerebellar tentorium, consistent with subdural empyemas.    Restricted diffusion within the left anterior and middle cranial fossa   collections is suggestive of purulent contents.        Increased gyriform enhancement within the left frontal and temporal lobes   likely represents evolving cerebritis.  Rim-enhancing small areas of   restricted diffusion within the left frontal region may represent evolving   blood versus abscess. No midline shift or herniation.      Previously noted anterior superior sagittal sinus thrombosis is no longer   seen.      Increased bifrontal subgaleal fluid.      Electronically Signed by: ARANZA GARZA M.D.    Signed on: 11/8/2022 12:38 AM          CT HEAD WO CONTRAST   Final Result      Postsurgical changes from bifrontal craniectomy.      No significant change in left frontal lobe parenchymal hemorrhage.  No new   intracranial hemorrhage.      Areas of edema involving the frontal lobes and anterior left temporal lobe,   likely related to cerebritis, have not significantly changed.      Thin, low-attenuation subdural collections along the left tentorial leaf,   in the left middle cranial fossa, and bilateral anterior cranial fossae,   which may reflect residual empyemas,  have not significantly changed in   size.      Electronically Signed by: JOSHUA GARCIA M.D.    Signed on: 11/1/2022 9:14 AM          XR CHEST AP OR PA 1 VIEW   Final Result      Improved aeration left lung.      Trace right pleural fluid.      This document is electronically signed by Jeanne Alcantar ([facility] pediatric radiologist), on 10/30/2022 17:44 PM CDT.          MRI BRAIN W WO CONTRAST   Final Result      Bilateral frontal and temporal craniectomies for evacuation of extensive   subdural empyemas along the skull base and cerebral convexities on both   sides, with residual subdural collections along the bilateral anterior   skull base and left tentorial leaflet suspicious for residual empyema.      Small-volume acute hemorrhage in the anterior left frontal lobe with   associated edema in the region of known cerebritis, grossly stable from   prior day postoperative CT exam. Persistent edema in the anterior left   temporal lobe and anteroinferior right frontal lobe related to cerebritis   and meningitis.       Stable appearance of thrombus in the anterior to mid superior sagittal   sinus.      Electronically Signed by: ARANZA GARZA M.D.    Signed on: 10/30/2022 1:18 AM          MRV HEAD W WO CONTRAST   Final Result      Bilateral frontal and temporal craniectomies for evacuation of extensive   subdural empyemas along the skull base and cerebral convexities on both   sides, with residual subdural collections along the bilateral anterior   skull base and left tentorial leaflet suspicious for residual empyema.      Small-volume acute hemorrhage in the anterior left frontal lobe with   associated edema in the region of known cerebritis, grossly stable from   prior day postoperative CT exam. Persistent edema in the anterior left   temporal lobe and anteroinferior right frontal lobe related to cerebritis   and meningitis.       Stable appearance of thrombus in the anterior to mid superior sagittal   sinus.       Electronically Signed by: ARANZA GARZA M.D.    Signed on: 10/30/2022 1:18 AM          XR CHEST AP OR PA 1 VIEW   Final Result   Apparent increase in left mid lung opacities, suboptimally   assessed due to overlapping support devices. Continued short-term follow-up   is suggested.      Electronically Signed by: DAYNE FORTUNE M.D.    Signed on: 10/29/2022 8:05 AM          XR CHEST AP OR PA 1 VIEW   Final Result      Low endotracheal tube at the right mainstem orifice.      This document is electronically signed by Jeanne Alcantar ([employer] pediatric radiologist), on 10/28/2022 21:41 PM CDT.          XR ABDOMEN 1 VIEW   Final Result      NG tube in the stomach.      This document is electronically signed by Jeanne Alcantar ([employer] pediatric radiologist), on 10/28/2022 21:37 PM CDT.          XR CHEST AP OR PA 1 VIEW   Final Result      ET tube tip is slightly low, extending into right mainstem bronchus.      Small region of hazy opacification along the right superior mediastinal   margin.  Evaluation is otherwise limited by overlapping tubing.  Small   region of atelectasis is possible.  Attention on follow-up recommended.      Electronically Signed by: DINORAH ROMEO M.D.    Signed on: 10/28/2022 9:24 PM          CT HEAD WO CONTRAST   Final Result      1. Interval bilateral frontal and temporal craniectomies for evacuation of   extensive subdural empyemas along the skull base and cerebral convexities   on both sides. No significant residual extra-axial fluid collections seen   in these regions. Suspected residual small thin subdural empyema along the   left tentorial leaf.      2. Interval development of small-volume acute hemorrhage in the anterior   left frontal lobe with associated edema in the region of known cerebritis.   Persistent edema in the anterior left temporal lobe and anteroinferior   right frontal lobe related to cerebritis and meningitis.      Electronically Signed by: SG ROLON  MD    Signed on: 10/28/2022 2:16 PM          MRV HEAD W WO CONTRAST   Final Result      1. Severe acute paranasal pyo sinusitis complicated by significant   intracranial extension as below.      2. Extensive subdural empyemas along the anterior skull base and cerebral   convexities on both sides as well as the left tentorial leaf. Larger left   frontal subdural empyema measures 9 mm in thickness.      3. Cerebritis in the anterior left frontal lobe, anterior left temporal   lobe, and to a lesser degree the anterior right frontal lobe. Associated   lepto- and pachy- meningitis in these regions. Purulent fluid in the   basilar cisterns related to meningitis.      4. Thrombophlebitis / thrombosis of the anterior and mid portions of the   superior sagittal sinus as well as a cortical vein in the left anterior   frontal region related to meningitis.      5. Probable orbital extension of infection subjacent to the superior   orbital rims on both sides. No orbital abscess.      6. No hydrocephalus. No intracranial midline shift or herniation.      Electronically Signed by: SG ROLON MD    Signed on: 10/28/2022 9:01 AM          MRI BRAIN W WO CONTRAST   Final Result      1. Severe acute paranasal pyo sinusitis complicated by significant   intracranial extension as below.      2. Extensive subdural empyemas along the anterior skull base and cerebral   convexities on both sides as well as the left tentorial leaf. Larger left   frontal subdural empyema measures 9 mm in thickness.      3. Cerebritis in the anterior left frontal lobe, anterior left temporal   lobe, and to a lesser degree the anterior right frontal lobe. Associated   lepto- and pachy- meningitis in these regions. Purulent fluid in the   basilar cisterns related to meningitis.      4. Thrombophlebitis / thrombosis of the anterior and mid portions of the   superior sagittal sinus as well as a cortical vein in the left anterior   frontal region related to  meningitis.      5. Probable orbital extension of infection subjacent to the superior   orbital rims on both sides. No orbital abscess.      6. No hydrocephalus. No intracranial midline shift or herniation.      Electronically Signed by: SG ROLON MD    Signed on: 10/28/2022 8:58 AM          CT HEAD WO CONTRAST   Final Result   Addendum 2 of 2   ADDENDUM:      10/28/22 05:13 Call From Hospital  Dr. wally Reveles on 10/28 05:05 (-05:   00) transferred call to Dr. Webber             Final   Given history of fever, findings concerning for cerebritis within the    left anterior cranial fossa.  Ill-defined extra-axial collection on the    left which could represent subdural empyema.  Etiology could be from    prominent paranasal sinus disease.  Evaluation limited on this    noncontrast study.  Recommend contrast-enhanced MRI to further evaluate.            Communications:       Call Doctor Other      Electronically signed by Anil Webber M.D. on 10 28 22 at 04:27                                   ASSESSMENT:  Principal Problem:    HA (headache)  Active Problems:    Acute bacterial sinusitis    Subdural empyema    Cerebritis    Thrombosis of superior sagittal sinus    Vini Suarez is a 13 year old male with hx of seasonal allergies admitted for severe sinusitis complicated by cerebritis with subdural empyema now s/p bifrontotemporoparietal craniectomy with evacuation of subdural empyema and b/l frontal sinusotomy with tissue removal, b/l anterior ethmoidectomy and right maxillary sinusotomy on 10/28. Pt is stable with site being stapled and c/d/i without erythema or drainage. Repeat MRI consistent with increasing subdural empyemas. Neurological exam is notable for new eye ptosis and given MR findings patient will undergo repeat operative intervention with NSGY team. Transferred to PICU for further neurological monitoring.     Active Problems:  1) Cerebritis  2) Subdural empyema s/p craniectomy and evacuation  3)  Venous sinus thrombosis  4) S/p endoscopic sinus surgery  5) Seizures    PLAN:  #cerebritis with subdural empyema   #s/p bifrontotemporoparietal craniectomy with evacuation of empyema  - MRI/MRV brain (10/28): Extensive subdural empyema along anterior skull base bilaterally, left frontal and left tentorial leaf region. Cerebritis in anterior left frontotemporal lobe, right frontal lobe with orbital extension. Purulent fluid in basal cisterns c/w meningitis. Thrombosis of superior sagittal sinus and cortical vein  - s/p craniectomy with evacuation of subdural empyema 10/28  - repeat MRI/MRV w/wo contrast (10/29) demonstrating small persistent area of abscess in deep temporal lobe with small amount of bleeding near area of cerebritis      - Hold anticoagulation  - dexamethasone 4mg q6h for cerebral edema      - s/p dexamethasone 6mg x1  - HOB 30  - tylenol q6h eric for pain/fever  - neurosurgery on consult   - JASON drain removed on 11/3          - Bacitracin to wound daily for 5 days  - Repeat MRI 11/7/22: increased size of  subdural empyemas   - Transferred to PICU for periop close monitoring   - NPO   - Pre-op labs    - Neuro checks q1h    - Plan for operative intervention with NSGY 11/9     #sinus venous thrombosis  - Maintain hyperhydration with 1.5mIVF  - Hold anticoagulation in setting of recent surgery  - Pending NSGY okay to start AC, will obtain coags prior to initiation      #Seizures   - s/p 20mg/kg Keppra load on 10/28, loaded with 20mg/kg fosphenytoin on 10/30, 10/31          - Trough 11/1 at 18  - CTH 11/1 -- no significant interval change from prior  - Seizures on: 1x 10/30, 1x 10/31, and recurrent/subclinical on EEG 11/1-11/2  - Neuro on consult, appreciate recs:            - Keppra 30mg/kg BID,    - Fosphenytoin 2.5mg/kg BID   - Vimpat 100mg BID             - 2mg Ativan PRN      # Agitation/hallucinations  - possibly 2/2 frontal lobe infection vs delirium  - Delirium precautions  - Behavioral  health/psych consult for medications    - Recs seroquel 25mg at bedtime, can give q12 prn  - Melatonin 3mg PO qHs  - [facility] [employer] on consult, working with pt and parents on emotional response to medical trauma  - Music/art therapy    ENT:   #severe sinusitis  - MRI with purulent sinusitis extending to orbits c/f orbital cellulitis without abscess  - Afrin 2 sprays each nasal cavity BID x3 days  - Flonase 2 sprays each nasal passage BID x1 month  - Nasal saline 2 sprays each nasal passage QID x1 month  - see ID for abx  - Nasal precautions: nothing per nares, no NG, avoid forceful nose blowing, straws, deep suctioning     RESP:   - No active issues; stable on room air     CV:    - No active issues  - Continuous cardiopulmonary monitoring     ENDO:   #Steroid-induced hyperglycemia  - Glucose 416 on AM chemistry 10/31  - Resolved per metabolic panel 11/3 (Glucose 135)  - Off TPN as of 11/5      GI/FEN:   - NPO  - D5NS with 20 mEq at 79 mL/hr  - Pepcid q12h ppx  - SLP to re-evaluate post weaning sedation  - s/p TPN  - Down trending triglycerides      HEME:  # Sinus thrombosis  # High risk for VTE  - hyperhydration as above for thrombosis  - no anticoagulation at this time, in setting of surgery given small amount of bleeding on MRI/MRV  -f/u protein C activity, protein S activity, factor VIII activity, prothrombin gene mutation, activated protein C resistance, DIC panel      - D-dimer 3.09, fibrinogen 795, PT 11.5, homocysteine nml, antithrombin III nml  - avoid NSAIDs post op  - SCDs for VTE ppx  - Heme on consult  - Order coags before starting AC     ID:  #cerebritis, meningitis  #subdural emypema  #severe sinusitis  - f/u 10/28 blood culture   - f/u surgical cultures    - growing gram positive cocci strep intermedius  - Procal 1.44, CRP 23.6  - Continue Ceftriaxone 2g IV BID s10/28  - Continue Flagyl 10mg/kg PO q8h s10/28 (transitioned to oral on 11/4)   - MRSA PCR negative      - s/p Vanc   - PICC on  10/30      - will need 4 weeks of IV abx  - ID on consult         -  Weekly CBC and CMP     Other:    - Working with PT, OT, and speech      VTE: Score 1 (ICU)   Lines: PICC line. Function, utilization, and necessity addressed/discussed on rounds  Dispo: Continued monitoring in PICU given intracranial infection requiring close monitoring with high risk of decompensation or change in mental status.      Patient and plan discussed with family, nursing staff, and attending physician.    Milo Figueroa [id number]  EM Resident - PICU Service  Please contact via [employer] or Phone #

## 2022-11-15 ENCOUNTER — MYC MEDICAL ADVICE (OUTPATIENT)
Dept: NEUROLOGY | Facility: CLINIC | Age: 42
End: 2022-11-15

## 2022-11-21 ENCOUNTER — HEALTH MAINTENANCE LETTER (OUTPATIENT)
Age: 42
End: 2022-11-21

## 2022-11-28 ENCOUNTER — OFFICE VISIT (OUTPATIENT)
Dept: SLEEP MEDICINE | Facility: CLINIC | Age: 42
End: 2022-11-28
Attending: PSYCHIATRY & NEUROLOGY
Payer: COMMERCIAL

## 2022-11-28 VITALS
HEIGHT: 72 IN | WEIGHT: 187.4 LBS | SYSTOLIC BLOOD PRESSURE: 137 MMHG | HEART RATE: 70 BPM | DIASTOLIC BLOOD PRESSURE: 75 MMHG | BODY MASS INDEX: 25.38 KG/M2 | OXYGEN SATURATION: 98 %

## 2022-11-28 DIAGNOSIS — G47.8 SLEEP RELATED EATING DISORDER: Primary | ICD-10-CM

## 2022-11-28 DIAGNOSIS — R06.83 SNORING: ICD-10-CM

## 2022-11-28 DIAGNOSIS — G47.10 HYPERSOMNIA: ICD-10-CM

## 2022-11-28 DIAGNOSIS — R51.9 SLEEP RELATED HEADACHES: ICD-10-CM

## 2022-11-28 PROBLEM — Z79.899 MEDICAL MARIJUANA USE: Status: ACTIVE | Noted: 2020-08-09

## 2022-11-28 PROCEDURE — 99204 OFFICE O/P NEW MOD 45 MIN: CPT | Performed by: INTERNAL MEDICINE

## 2022-11-28 ASSESSMENT — SLEEP AND FATIGUE QUESTIONNAIRES
HOW LIKELY ARE YOU TO NOD OFF OR FALL ASLEEP WHILE LYING DOWN TO REST IN THE AFTERNOON WHEN CIRCUMSTANCES PERMIT: HIGH CHANCE OF DOZING
HOW LIKELY ARE YOU TO NOD OFF OR FALL ASLEEP WHILE WATCHING TV: MODERATE CHANCE OF DOZING
HOW LIKELY ARE YOU TO NOD OFF OR FALL ASLEEP WHEN YOU ARE A PASSENGER IN A CAR FOR AN HOUR WITHOUT A BREAK: MODERATE CHANCE OF DOZING
HOW LIKELY ARE YOU TO NOD OFF OR FALL ASLEEP WHILE SITTING QUIETLY AFTER LUNCH WITHOUT ALCOHOL: WOULD NEVER DOZE
HOW LIKELY ARE YOU TO NOD OFF OR FALL ASLEEP WHILE SITTING AND TALKING TO SOMEONE: WOULD NEVER DOZE
HOW LIKELY ARE YOU TO NOD OFF OR FALL ASLEEP WHILE SITTING AND READING: MODERATE CHANCE OF DOZING
HOW LIKELY ARE YOU TO NOD OFF OR FALL ASLEEP WHILE SITTING INACTIVE IN A PUBLIC PLACE: WOULD NEVER DOZE
HOW LIKELY ARE YOU TO NOD OFF OR FALL ASLEEP IN A CAR, WHILE STOPPED FOR A FEW MINUTES IN TRAFFIC: WOULD NEVER DOZE

## 2022-11-28 NOTE — PATIENT INSTRUCTIONS
Patient education: What is a sleep study?     What is a sleep study? -- A sleep study is a test that measures how well you sleep and checks for sleep problems. For some sleep studies, you stay overnight in a sleep lab at a hospital or sleep center.     What happens during a sleep study? -- Before you go to sleep, a technician attaches small, sticky patches called  electrodes  to your head, chest, and legs. He or she will also place a small tube beneath your nose and might wrap 1 or 2 belts around your chest.   Each of these items has wires that connect to monitors. The monitors record your movement, brain activity, breathing, and other body functions while you sleep.  If you have a history of trouble falling asleep, your doctor might prescribe a medicine to help you fall asleep in the lab. If you have never taken the medicine before, your doctor might ask you take it on a night before your sleep study to see how it affects you.   Why might my doctor order a sleep study? -- Your doctor will order a sleep study if he or she thinks you have sleep apnea or a different condition that makes you:   ?Have sudden jerking leg movements while you sleep, called  periodic limb movements.    ?Feel very sleepy during the day and fall asleep all of a sudden, called  narcolepsy.    ?Have trouble falling asleep or staying asleep over a long period of time, called  chronic insomnia.    ?Do odd things while you sleep, such as walking.  How should I prepare for a sleep study? -- On the day of your sleep study, you should:   ?Avoid alcohol   ?Avoid drinking coffee, tea, sodas, and other drinks that have caffeine in the afternoon and evening   ?Take all of your regular medicines     The cost of care estimate line is [phone]. They are able to give the patient an estimate of the charges and also an estimate of their insurance coverage/patient responsibility.   After your sleep study is performed, please call us at [phone] or  [phone]  to schedule for a follow up to review the results of the sleep study.    Please bring one tab of low dose melatonin 3 mg or less to the night of the study.    Melatonin intake is completely voluntary.    You may take own melatonin after arrival to sleep center. Do not drive or operate machinery after intake of melatonin.     Please make every effort you can to sleep on your back with one pillow behind your head.    Please also call your insurance company next week to see if your sleep study is approved and find out your co-pay.

## 2022-11-28 NOTE — PROGRESS NOTES
Additional 15 minutes on the date of service was spent performing the following:    -Preparing to see the patient  -Obtaining and/or reviewing separately obtained history   -Ordering medications, tests, or procedures   -Documenting clinical information in the electronic or other health record     Assessment and Plan:    In summary Jovan Cohen is a 42 year old year old adult here for sleep disturbance.  1. Sleep related eating disorder/Hypersomnia/Snoring/Sleep related headaches   Jovan Cohen has high risk for obstructive sleep apnea based on the history of sleep related eating, hypersomnia and a crowded airway. I educated the patient on the underlying pathophysiology of obstructive sleep apnea. We reviewed the risks associated with sleep apnea, including increased cardiovascular risk and overall death. We talked about treatments briefly. I recommend getting an baseline nocturnal polysomnography with seizure montage and TCM. The patient should return to the clinic to discuss results and treatment option in a patient-centered approach.    History of present illness:    Jovan Cohen is a 42 year old adult who comes to the clinic with a chief complaint of sleep-related eating that is been going on for approximately 2 years.  While the patient denies any episodes of witnessed apnea he has been told that he does have snoring during sleep.  He also suffers from excessive daytime sleepiness has been going on for approximately 2 years.  Complicating matters further is the fact that the patient is a chronic pain patient and his daily opioids.  He has a history of sleepwalking as a child and also has had night terrors in the past.  He reports that approximately 2 years ago he would have episodes of eating when he is asleep at least 2-3 times per month.  He also reports more frequently having episodes of waking up at night fully awake but have an uncontrollable compulsion to eat.  He denies any episodes of loss  of bowel or bladder function but has had a history of waking up biting his tongue in the morning.     Ideal Sleep-Wake Cycle(devoid of societal pressure):    Patient would try to initiate sleep at around 10 PM with a sleep latency of 15 minutes. The patient would have 5-10 awakenings. Final wake up time is around 4 AM.    SI:  TRUNG Total Score: 16  Total score - [place]: 9 (11/28/2022 10:29 AM)    Patient told to return in one week after the sleep study is interpreted.    Patient Active Problem List   Diagnosis     Chronic pain disorder     Anxiety state     DDD (degenerative disc disease), lumbar     Pain medication agreement signed     Mild intermittent asthma without complication       Past Medical History  No past medical history on file.     Past Surgical History  No past surgical history on file.     Meds  Current Outpatient Medications   Medication Sig Dispense Refill     albuterol (PROAIR HFA/PROVENTIL HFA/VENTOLIN HFA) 108 (90 Base) MCG/ACT inhaler every 4 hours as needed       baclofen (LIORESAL) 10 MG tablet daily       cholecalciferol (VITAMIN D3) 125 mcg (5000 units) capsule Take 5,000 Units by mouth every 3 days       Dalfampridine 10 MG TB12 TAKE 1 TABLET (10 MG) BY MOUTH TWO TIMES DAILY. 60 tablet 11     dimethyl fumarate 240 MG CPDR Take 1 capsule (240 mg) by mouth 2 times daily 60 capsule 11     HYDROcodone-acetaminophen (NORCO) 5-325 MG tablet Take 1-2 tablets by mouth every 8 hours as needed       ibuprofen (ADVIL/MOTRIN) 200 MG tablet Take 200-400 mg by mouth every 6 hours as needed       WIXELA INHUB 250-50 MCG/DOSE inhaler           Allergies  Latex and Fingolimod     Social History  Social History     Socioeconomic History     Marital status:      Spouse name: Not on file     Number of children: Not on file     Years of education: Not on file     Highest education level: Not on file   Occupational History     Not on file   Tobacco Use     Smoking status: Never     Smokeless tobacco:  Never   Substance and Sexual Activity     Alcohol use: Not Currently     Drug use: Not Currently     Sexual activity: Not on file   Other Topics Concern     Not on file   Social History Narrative     Not on file     Social Determinants of Health     Financial Resource Strain: Not on file   Food Insecurity: Not on file   Transportation Needs: Not on file   Physical Activity: Not on file   Stress: Not on file   Social Connections: Not on file   Intimate Partner Violence: Not on file   Housing Stability: Not on file        Family History  Family History   Problem Relation Age of Onset     Snoring Father      Review of Systems:  Constitutional: Negative except as noted in HPI.   Eyes: Negative except as noted in HPI.   ENT: Negative except as noted in HPI.   Cardiovascular: Negative except as noted in HPI.   Respiratory: Negative except as noted in HPI.   Gastrointestinal: Negative except as noted in HPI.   Genitourinary: Negative except as noted in HPI.   Musculoskeletal: Negative except as noted in HPI.   Integumentary: Negative except as noted in HPI.   Neurological: Negative except as noted in HPI.   Psychiatric: Negative except as noted in HPI.   Endocrine: Negative except as noted in HPI.   Hematologic/Lymphatic: Negative except as noted in HPI.      Physical Exam:  BP (!) 144/80   Pulse 70   Ht 1.829 m (6')   Wt 85 kg (187 lb 6.4 oz)   SpO2 98%   BMI 25.42 kg/m    BMI:Body mass index is 25.42 kg/m .   GEN: NAD, appropriate for age  Head: Normocephalic.  EYES: PERRLA, EOMI  ENT: Oropharynx is clear, Gan class 4+ airway.   Nasal mucosa is moist without erythema  Neck : Thyroid is within normal limits.   CV: Regular rate and rhythm, S1 & S2 positive.  LUNGS: Bilateral breathsounds heard.   ABDOMEN: Positive bowel sounds in all quadrants, soft, no rebound or guarding  MUSCULOSKELETAL: Bilateral trace leg swelling  SKIN: warm, dry, no rashes  Neurological: Alert, oriented to time, place, and person. Gait is  normal. Strength 5/5 in all extremities.  Psych: normal mood, normal affect     Labs/Studies:     No results found for: PH, PHARTERIAL, PO2, XT4DBUOOBID, SAT, PCO2, HCO3, BASEEXCESS, JOVANNY, BEB  No results found for: TSH  No results found for: GLC  Lab Results   Component Value Date    HGB 13.7 11/07/2022     No results found for: BUN, CR  Lab Results   Component Value Date    AST 49 (H) 11/07/2022    ALT 88 (H) 11/07/2022    ALKPHOS 40 11/07/2022    BILITOTAL 0.4 11/07/2022     No results found for: UAMP, UBARB, BENZODIAZEUR, UCANN, UCOC, OPIT, UPCP      Patient verbalized understanding of these issues, agrees with the plan and all questions were answered today. Patient was given an opportuntity to voice any other symptoms or concerns not listed above. Patient did not have any other symptoms or concerns.         Rich Dewey DO,   Board Certified in Internal Medicine and Sleep Medicine    (Note created with Dragon voice recognition and unintended spelling errors and word substitutions may occur)

## 2022-11-28 NOTE — NURSING NOTE
BP was rechecked and documented-WNL. Sleep study and follow-up appointment with provider have both been scheduled. PSG handed to patient at clinic visit.  Leah Mccullough CMA

## 2022-11-28 NOTE — NURSING NOTE
Chief Complaint   Patient presents with     Sleep Problem     Patient had woken up vomiting stomach acid. Has trouble staying asleep. Patient states he has woken up with a mess in the kitchen. He had eaten food during his sleep.        Initial BP (!) 144/80   Pulse 70   Ht 1.829 m (6')   Wt 85 kg (187 lb 6.4 oz)   SpO2 98%   BMI 25.42 kg/m   Estimated body mass index is 25.42 kg/m  as calculated from the following:    Height as of this encounter: 1.829 m (6').    Weight as of this encounter: 85 kg (187 lb 6.4 oz).    Medication Reconciliation: complete    Neck circumference: 36 centimeters.  Marcos Herron CMA on 11/28/2022 at 10:39 AM

## 2022-12-09 NOTE — TELEPHONE ENCOUNTER
Jovan had not read Dr. Agarwal's my chart message so called him and notified him of his lab results. Joavn said his PCP had already told him about this vitamin D level and put him on a vitamin D supplement.    Pinky Stack RN

## 2022-12-15 ENCOUNTER — TELEPHONE (OUTPATIENT)
Dept: NEUROLOGY | Facility: CLINIC | Age: 42
End: 2022-12-15

## 2022-12-15 NOTE — TELEPHONE ENCOUNTER
PA Initiation    Medication: Dimethyl Fumarate 240MG dr capsules (PA PENDING)  Insurance Company: [employer] - Phone [phone] Fax [phone]  Pharmacy Filling the Rx: [address]  Filling Pharmacy Phone:    Filling Pharmacy Fax:    Start Date: 12/15/2022        Thank you,    Corrina Chaudhari [facility]  Specialty Pharmacy Clinic Liaison - CardiologyNeurologyMultiple Sclerosis  [facility]  [address] [place] [address]  Ph: [phone] Fax: [phone]  [email or web address]

## 2022-12-19 NOTE — TELEPHONE ENCOUNTER
Prior Authorization Approval    Authorization Effective Date: 11/15/2022  Authorization Expiration Date: 12/16/2023  Medication: Dimethyl Fumarate 240MG dr capsules ([place] APPROVED)  Approved Dose/Quantity: 30 days  Reference #: CMM KEY: [id number]   Insurance Company: [employer] - Phone [phone] Fax [phone]  Expected CoPay:       CoPay Card Available: No    Foundation Assistance Needed:    Which Pharmacy is filling the prescription (Not needed for infusion/clinic administered): [place], [place] - [address] [place] [address]  Pharmacy Notified:    Patient Notified:            Thank you,    Corrina Chaudhari [facility]  Specialty Pharmacy Clinic Liaison - CardiologyNeurologyMultiple Sclerosis  [facility]  [address] [place] [address]  Ph: [phone] Fax: [phone]  [email or web address]

## 2023-03-03 NOTE — TELEPHONE ENCOUNTER
Chief Complaint   Patient presents with     Well Child     16 Year Well Child      Sports Physical                Medication Reconciliation: joi Doherty     PA Initiation    Medication: Dimethyl Fumarate--Initiated  Insurance Company: [employer] - Phone [phone] Fax [phone]  Pharmacy Filling the Rx:    Filling Pharmacy Phone:    Filling Pharmacy Fax:    Start Date: 12/30/2021    Key: [id number]

## 2023-03-31 ENCOUNTER — TELEPHONE (OUTPATIENT)
Dept: NEUROLOGY | Facility: CLINIC | Age: 43
End: 2023-03-31
Payer: COMMERCIAL

## 2023-03-31 DIAGNOSIS — G35 MULTIPLE SCLEROSIS (H): ICD-10-CM

## 2023-03-31 NOTE — TELEPHONE ENCOUNTER
Health Call Center    Phone Message    May a detailed message be left on voicemail: yes     Reason for Call: Medication Question or concern regarding medication   Prescription Clarification  Name of Medication: dimethyl fumarate 240 MG CPDR/  Ampyra  Prescribing Provider:    Pharmacy:  Unknown, per pt- new insurance    What on the order needs clarification?    Patient called states that due to his insurance he is [facility], patient is unsure where he can get these filled. Please call patients phone at [phone]     Per patient he is out of both medication.          Action Taken: Message routed to:  [facility] ([facility]    Travel Screening: Not Applicable

## 2023-04-03 ENCOUNTER — TELEPHONE (OUTPATIENT)
Dept: NEUROLOGY | Facility: CLINIC | Age: 43
End: 2023-04-03
Payer: COMMERCIAL

## 2023-04-03 NOTE — TELEPHONE ENCOUNTER
Prior Authorization Not Needed per Insurance    Medication: Dimethyl Fumarate 240 MG CPDR (NO PA NEEDED)  Insurance Company: [employer] - Phone [phone] Fax [phone]  Expected CoPay:      Pharmacy Filling the Rx: [facility] - [place], [place] - [address]  Pharmacy Notified:    Patient Notified:      PA is not needed for Dimethyl Fumarate. It is a covered medication under his formulary. The expected copay is $1 for a 30 day supply.    Dalfampridine does require a PA. I will submit the PA and create a separate encounter.     Thank you,    Corrina Chaudhari h-T  Specialty Pharmacy Clinic Liaison - CardiologyNeurologyMultiple [facility]  [address] [place] [address]  [address] [place] [address]  Ph: [phone] Fax: [phone]  [email or web address]

## 2023-04-03 NOTE — TELEPHONE ENCOUNTER
PA Initiation    Medication: Dalfampridine ER 10MG Tablets ([place] PENDING)  Insurance Company: [employer] - Phone [phone] Fax [phone]  Pharmacy Filling the Rx: [facility] - [place], [place] - [address]  Filling Pharmacy Phone:    Filling Pharmacy Fax:    Start Date: 4/3/2023        Thank you,    Corrina Chaudhari h-T  Specialty Pharmacy Clinic Liaison - CardiologyNeurologyMultiple Sclerosis  [facility] [address]  [address] [place] [address]  Ph: [phone] Fax: [phone]  [email or web address]

## 2023-04-03 NOTE — TELEPHONE ENCOUNTER
Unable to reach patient to discuss. VM box has not been set up so message could not be left. Message sent to pharmacy liaison to submit PA through new insurance and determine where prescriptions can be sent.     Pao Arreola RNCC  Neurology/Neurosurgery

## 2023-04-03 NOTE — TELEPHONE ENCOUNTER
Pt reports that he only has MA at this time and no longer through [employer]. He will call his county worker to retrieve his new insurance information and call back back to update this in the system. He is aware that a new PA will need to be submitted and prescriptions sent to a different speciality pharmacy. Message has already been routed to pharmacy liaison to advise. Pt will wait for an update from writer.       Pao Arreola, RNCC  Neurology/Neurosurgery

## 2023-04-03 NOTE — TELEPHONE ENCOUNTER
I was able to obtain his new insurance information. As of 4/1 he has coverage through [employer] [facility].    ID #: [id number]  Bin: [id number]  RX Group: [id number]  PCN: [id number]    Thank you,    Corrina Chaudhari [facility]  Specialty Pharmacy Clinic Liaison - CardiologyNeurologyMultiple Sclerosis  [facility] [address]  [address] [place] [address]  Ph: [phone] Fax: [phone]  [email or web address]

## 2023-04-03 NOTE — TELEPHONE ENCOUNTER
[facility] Call Center    Phone Message    May a detailed message be left on voicemail: yes     Reason for Call: Other: Pt called back writer read message to  pt, pt has more questions.     Please follow up with patient.    Phone number to reach patient:  Cell number on file:    Telephone Information:   Mobile [phone]       Action Taken: Message routed to: [facility]    Travel Screening: Not Applicable    Caleb Madrigal on 4/3/2023 at 8:56 AM   - Neurology

## 2023-04-04 RX ORDER — DALFAMPRIDINE 10 MG/1
TABLET, FILM COATED, EXTENDED RELEASE ORAL
Qty: 60 TABLET | Refills: 11 | Status: SHIPPED | OUTPATIENT
Start: 2023-04-04

## 2023-04-04 RX ORDER — DIMETHYL FUMARATE 240 MG/1
240 CAPSULE ORAL 2 TIMES DAILY
Qty: 60 CAPSULE | Refills: 11 | Status: SHIPPED | OUTPATIENT
Start: 2023-04-04

## 2023-04-04 NOTE — TELEPHONE ENCOUNTER
Prior Authorization Approval    Authorization Effective Date: 4/1/2023  Authorization Expiration Date: 4/3/2024  Medication: Dalfampridine ER 10MG Tablets ([place] APPROVED)  Approved Dose/Quantity: 30 days  Reference #:     Insurance Company: [employer] - Phone [phone] Fax [phone]  Expected CoPay:       CoPay Card Available: No    Foundation Assistance Needed:    Which Pharmacy is filling the prescription (Not needed for infusion/clinic administered): [facility] - [place], [place] - [address]  Pharmacy Notified:    Patient Notified:            Thank you,    Corrina Chaudhari h-T  Specialty Pharmacy Clinic Liaison - CardiologyNeurologyMultiple Sclerosis  [facility]  [address] [place] [address]  Ph: [phone] Fax: [phone]  [email or web address]

## 2023-04-04 NOTE — TELEPHONE ENCOUNTER
Writer unable to leave patient a message as VM box has not been set up. Writer to inform patient that prescriptions for Dimethyl Fumarate and Dalfampridine sent to  speciality pharmacy. PA still pending for Dalfampridine. Dimethyl Fumarate is a covered and expected copay is $1 for a 30 day supply.       Pao Arreola, RNCC  Neurology/Neurosurgery

## 2023-04-13 NOTE — TELEPHONE ENCOUNTER
Writer confirmed with patient that he has received both prescriptions and no further questions/concerns at this time.       Pao Arreola, RNCC  Neurology/Neurosurgery

## 2023-07-11 ENCOUNTER — NURSE TRIAGE (OUTPATIENT)
Dept: NURSING | Facility: CLINIC | Age: 43
End: 2023-07-11

## 2023-07-11 DIAGNOSIS — G35 MS (MULTIPLE SCLEROSIS) (H): Primary | ICD-10-CM

## 2023-07-11 NOTE — TELEPHONE ENCOUNTER
Nurse Triage SBAR    Is this a 2nd Level Triage?  Yes    Situation:   Requesting IV Prednisone      Background/Assessment:    Pt is wondering if they can get an order for IV Prednisone for an MS Flare up.    Pt is having issues with his balance and some weakness in his arms and legs.    The other night Pt was putting some flavored water into the fridge.  He stood up and bump his head on the freeze door.        Pt does not think there is an correlation between the two.    Pt would like a call back from the clinic and requesting an order for IV prednisone Therapy to be sent to the [facility] which is close to his home.   Pt did not want to go to the ER.       Please call the Pt back @ [phone] for further assistance.    Mariaelena Hanson RN  Central Triage Red Flags/Med Refills      Protocol Recommended Disposition:   See in Office Today      Reason for Disposition    Patient wants to be seen    Additional Information    Negative: Difficult to awaken or acting confused (e.g., disoriented, slurred speech)    Negative: New neurologic deficit that is present NOW, sudden onset of ANY of the following: * Weakness of the face, arm, or leg on one side of the body* Numbness of the face, arm, or leg on one side of the body* Loss of speech or garbled speech    Negative: Sounds like a life-threatening emergency to the triager    Negative: Confusion, disorientation, or hallucinations is main symptom    Negative: Dizziness is main symptom    Negative: Followed a head injury within last 3 days    Negative: Headache (with neurologic deficit)    Negative: Unable to urinate (or only a few drops) and bladder feels very full    Negative: Loss of bladder or bowel control (urine or bowel incontinence; wetting self, leaking stool) of new-onset    Negative: Back pain with numbness (loss of sensation) in groin or rectal area    Negative: Neurologic deficit that was brief (now gone), ANY of the following: * Weakness  of the face, arm, or leg on one side of the body * Numbness of the face, arm, or leg on one side of the body * Loss of speech or garbled speech    Negative: Patient sounds very sick or weak to the triager    Negative: Neurologic deficit of gradual onset (e.g., days to weeks), ANY of the following: * Weakness of the face, arm, or leg on one side of the body* Numbness of the face, arm, or leg on one side of the body* Loss of speech or garbled speech    Negative: Stover palsy suspected (i.e., weakness only one side of the face, developing over hours to days, no other symptoms)    Negative: Tingling (e.g., pins and needles) of the face, arm or leg on one side of the body, that is present now (Exceptions: Chronic or recurrent symptom lasting > 4 weeks; or tingling from known cause, such as: bumped elbow, carpal tunnel syndrome, pinched nerve, frostbite.)    Negative: Neck pain (with neurologic deficit)    Negative: Back pain (with neurologic deficit)    Protocols used: NEUROLOGIC DEFICIT-A-[place]

## 2023-07-11 NOTE — TELEPHONE ENCOUNTER
"Spoke with Jovan.  He endorses left-sided weakness, numbness, specifically his arm (he keeps dropping things and \"it feels like it's full of novacaine\") and trouble walking.  Vision is worse.  He did hit his head two days ago and says that these symptoms started after that (but he doesn't think they are related since he believes his symptoms are due to MS flare). Requesting IV steroids.  Routing to Dr. Agarwal, please advise.    Pinky Stack RN     "

## 2023-07-12 NOTE — TELEPHONE ENCOUNTER
Favor MRI imaging in the near future. If he is having a genuine relapse we need to verify that and adjust treatment accordingly.    Please contact patient and assess whether he has any symptoms of illness and explain recommendation for MRI. Can send orders locally for imaging if he agrees. If he does not feel that he can wait he should go to the ED.

## 2023-07-13 NOTE — TELEPHONE ENCOUNTER
Spoke with Jovan. He is currently hospitalized in [place].  Stated he is getting IV steroids and had a brain MRI that showed new lesion activity. Will notify Dr. Agarwal.    Pinky Stack RN

## 2023-07-17 ENCOUNTER — TELEPHONE (OUTPATIENT)
Dept: NEUROLOGY | Facility: CLINIC | Age: 43
End: 2023-07-17

## 2023-07-17 ENCOUNTER — LAB (OUTPATIENT)
Dept: LAB | Facility: CLINIC | Age: 43
End: 2023-07-17
Payer: COMMERCIAL

## 2023-07-17 ENCOUNTER — OFFICE VISIT (OUTPATIENT)
Dept: NEUROLOGY | Facility: CLINIC | Age: 43
End: 2023-07-17
Payer: COMMERCIAL

## 2023-07-17 VITALS
BODY MASS INDEX: 24.55 KG/M2 | WEIGHT: 181 LBS | DIASTOLIC BLOOD PRESSURE: 71 MMHG | SYSTOLIC BLOOD PRESSURE: 128 MMHG | HEART RATE: 90 BPM

## 2023-07-17 DIAGNOSIS — R26.9 GAIT DISTURBANCE: ICD-10-CM

## 2023-07-17 DIAGNOSIS — G35 MS (MULTIPLE SCLEROSIS) (H): Primary | ICD-10-CM

## 2023-07-17 DIAGNOSIS — G35 MS (MULTIPLE SCLEROSIS) (H): ICD-10-CM

## 2023-07-17 LAB
ALBUMIN SERPL BCG-MCNC: 4.5 G/DL (ref 3.5–5.2)
ALP SERPL-CCNC: 34 U/L (ref 35–129)
ALT SERPL W P-5'-P-CCNC: 37 U/L (ref 0–70)
AST SERPL W P-5'-P-CCNC: 21 U/L (ref 0–45)
BILIRUB DIRECT SERPL-MCNC: <0.2 MG/DL (ref 0–0.3)
BILIRUB SERPL-MCNC: 0.3 MG/DL
PROT SERPL-MCNC: 6.4 G/DL (ref 6.4–8.3)

## 2023-07-17 PROCEDURE — 80076 HEPATIC FUNCTION PANEL: CPT

## 2023-07-17 PROCEDURE — 99215 OFFICE O/P EST HI 40 MIN: CPT | Performed by: PSYCHIATRY & NEUROLOGY

## 2023-07-17 PROCEDURE — 86702 HIV-2 ANTIBODY: CPT | Mod: 59

## 2023-07-17 PROCEDURE — 87340 HEPATITIS B SURFACE AG IA: CPT

## 2023-07-17 PROCEDURE — 86481 TB AG RESPONSE T-CELL SUSP: CPT

## 2023-07-17 PROCEDURE — 36415 COLL VENOUS BLD VENIPUNCTURE: CPT

## 2023-07-17 PROCEDURE — 87536 HIV-1 QUANT&REVRSE TRNSCRPJ: CPT | Mod: 59

## 2023-07-17 PROCEDURE — 86704 HEP B CORE ANTIBODY TOTAL: CPT

## 2023-07-17 PROCEDURE — 82784 ASSAY IGA/IGD/IGG/IGM EACH: CPT

## 2023-07-17 PROCEDURE — 87389 HIV-1 AG W/HIV-1&-2 AB AG IA: CPT

## 2023-07-17 PROCEDURE — 86787 VARICELLA-ZOSTER ANTIBODY: CPT

## 2023-07-17 PROCEDURE — 86701 HIV-1ANTIBODY: CPT | Mod: 59

## 2023-07-17 RX ORDER — FLUTICASONE FUROATE AND VILANTEROL 100; 25 UG/1; UG/1
1 POWDER RESPIRATORY (INHALATION) DAILY
COMMUNITY

## 2023-07-17 NOTE — Clinical Note
"    7/17/2023         RE: Jovan Cohen  [address]  [facility] [address]        Dear Colleague,    Thank you for referring your patient, Jovan Cohen, to the [facility] [place]. Please see a copy of my visit note below.    Referral source: Established patient    Chief complaint: Multiple sclerosis    History of the Present Illness: Mr. Jovan Cohen is a 42 year old right-handed man who returns to the Multiple Sclerosis Clinic today for follow-up of recent hospitalization at an outside facility.    The patient's history is as per my previous notes.  He has a history of symptoms of demyelinating disease likely dating back to the late 1990s, when he developed unexplained pain in the distal fingers.  In 2002 he had an episode of vertigo with vertical nystagmus.  Multiple sclerosis was formally diagnosed in 2004 when he had unsteady walking and double vision.  He has previously been treated with glatiramer acetate and fingolimod, and most recently has been on dimethyl fumarate since around 2015.     Unfortunately, the patient recently presented to the emergency department at [facility] in [place] with complaints of left-sided weakness, incoordination and gait disturbance.  He tells me that on Sunday, July 9 he \"lost (his) balance and bumped (his) head on the freezer door.  When he awoke the next morning, his imbalance was substantially worse.  By the day after that, he was concerned enough that his symptoms that he went to the emergency department.    An MRI scan of the brain was performed and, per report, demonstrated presence of multiple enhancing lesions consistent with active inflammatory demyelination.  Unfortunately, I do not have those images available for my review today.    He was treated with 5 days of IV methylprednisolone at 1 g daily and then discharged home on the sixth day of the hospitalization (that is, yesterday).     Today, he is telling me that his " balance does not seem any better.  He is also noticing new numbness of the right arm.  He was receiving physical therapy in the hospital but outpatient follow-up therapies have not been arranged as of yet.    PHYSICAL EXAMINATION:  VITAL SIGNS: Blood pressure 128/71; pulse 90; weight 82.1 kg  GENERAL: Well-nourished man presents to the examination accompanied by his father, awake and alert and in no acute distress.  His thought processes are quite tangential.    NEUROLOGIC EXAMINATION:  CRANIAL NERVES: Visual fields are full to confrontation.  Extraocular movements are intact with no internuclear ophthalmoplegia.  Facial strength is normal.  Palate elevation and tongue protrusion are normal.  POWER: Strength is within normal limits in proximal and distal muscles in the upper limbs.  In the lower limbs, hip flexors grade 4+ on the right and 4 on the left, hamstrings 5 bilaterally, and anterior tibialis muscles 4+ bilaterally.  REFLEXES: Reflexes are symmetric and within normal limits in the arms and legs.  MOTOR/CEREBELLAR: There is no appendicular ataxia on finger-to-nose testing.  Rapid alternating movements are within normal limits in the hands and fingers.  There is no pronator drift in the arms.  GAIT: He can ambulate on a flat, level surface without gross loss of postural stability.    Assessment/plan:    1. Multiple sclerosis  Unfortunately, the patient is experiencing a significant clinical relapse of multiple sclerosis with multiple active lesions demonstrated on brain MRI and decline in ambulation. We will have him fill out a release to obtain the images from the brain MRI performed during his recent hospitalization.    This demonstrates that his current treatment with dimethyl fumarate is not adequate to control the active inflammatory component of his condition.  In this context, we need to consider alternative treatment options.  Today, I will obtain laboratory studies for risk stratification to  determine which high efficacy therapies would be safe for him, to include hepatic panel, hepatitis B and C serologies, varicella serology, HIV serology, QuantiFERON screen for tuberculosis, total antibody (IgG, IgA and IgM) levels and LESA virus serology.    I will arrange to see him back in 2 to 3 weeks to go over these results and decide on an ongoing plan of care.    2. Gait disturbance  The patient clearly needs to continue physical therapy to try to return to his baseline level of function.  I provided him with a written referral to physical therapy, which he will pursue in his home community.    At present, with the assistance of his family, he does not feel unsafe in his home environment.  If he were to worsen to a point where he did not feel that he could safely remain at home, we would need to pursue admission to the [facility] for rescue therapy with plasma exchange.    I spent a total of 51 minutes of patient care activities related to this encounter on the date of service, including time spent reviewing the chart, obtaining history and examination from and in counseling the patient, and in coordination of care via the electronic medical record.      Again, thank you for allowing me to participate in the care of your patient.        Sincerely,        Ubaldo Agarwal MD

## 2023-07-17 NOTE — PATIENT INSTRUCTIONS
Continue dimethyl fumarate for now    2.  Blood tests today    3.  Return to clinic on August 7, as scheduled    4.  We will refer you to physical therapy for evaluation and management of gait disturbance

## 2023-07-17 NOTE — TELEPHONE ENCOUNTER
Left Voicemail (1st Attempt) for the patient to call back and schedule the following:    Appointment type: return  Provider: dr. sanders  Return date: 1/17/2024  Specialty phone number: [phone]   Additonal Notes: Return in about 6 months (around 1/17/2024).    Elzbieta haynes Procedure   Orthopedics, Podiatry, Sports Medicine, Ent ,Eye , Audiology, Adult Endocrine & Diabetes, Nutrition & Medication Therapy Management Specialties   [facility]

## 2023-07-17 NOTE — PROGRESS NOTES
"Referral source: Established patient    Chief complaint: Multiple sclerosis    History of the Present Illness: Mr. Jovan Cohen is a 42 year old right-handed man who returns to the Multiple Sclerosis Clinic today for follow-up of recent hospitalization at an outside facility.    The patient's history is as per my previous notes.  He has a history of symptoms of demyelinating disease likely dating back to the late 1990s, when he developed unexplained pain in the distal fingers.  In 2002 he had an episode of vertigo with vertical nystagmus.  Multiple sclerosis was formally diagnosed in 2004 when he had unsteady walking and double vision.  He has previously been treated with glatiramer acetate and fingolimod, and most recently has been on dimethyl fumarate since around 2015.     Unfortunately, the patient recently presented to the emergency department at [facility] in [place] with complaints of left-sided weakness, incoordination and gait disturbance.  He tells me that on Sunday, July 9 he \"lost (his) balance and bumped (his) head on the freezer door.  When he awoke the next morning, his imbalance was substantially worse.  By the day after that, he was concerned enough that his symptoms that he went to the emergency department.    An MRI scan of the brain was performed and, per report, demonstrated presence of multiple enhancing lesions consistent with active inflammatory demyelination.  Unfortunately, I do not have those images available for my review today.    He was treated with 5 days of IV methylprednisolone at 1 g daily and then discharged home on the sixth day of the hospitalization (that is, yesterday).     Today, he is telling me that his balance does not seem any better.  He is also noticing new numbness of the right arm.  He was receiving physical therapy in the hospital but outpatient follow-up therapies have not been arranged as of yet.    PHYSICAL EXAMINATION:  VITAL SIGNS: Blood pressure 128/71; " pulse 90; weight 82.1 kg  GENERAL: Well-nourished man presents to the examination accompanied by his father, awake and alert and in no acute distress.  His thought processes are quite tangential.    NEUROLOGIC EXAMINATION:  CRANIAL NERVES: Visual fields are full to confrontation.  Extraocular movements are intact with no internuclear ophthalmoplegia.  Facial strength is normal.  Palate elevation and tongue protrusion are normal.  POWER: Strength is within normal limits in proximal and distal muscles in the upper limbs.  In the lower limbs, hip flexors grade 4+ on the right and 4 on the left, hamstrings 5 bilaterally, and anterior tibialis muscles 4+ bilaterally.  REFLEXES: Reflexes are symmetric and within normal limits in the arms and legs.  MOTOR/CEREBELLAR: There is no appendicular ataxia on finger-to-nose testing.  Rapid alternating movements are within normal limits in the hands and fingers.  There is no pronator drift in the arms.  GAIT: He can ambulate on a flat, level surface without gross loss of postural stability.    Assessment/plan:    1. Multiple sclerosis  Unfortunately, the patient is experiencing a significant clinical relapse of multiple sclerosis with multiple active lesions demonstrated on brain MRI and decline in ambulation. We will have him fill out a release to obtain the images from the brain MRI performed during his recent hospitalization.    This demonstrates that his current treatment with dimethyl fumarate is not adequate to control the active inflammatory component of his condition.  In this context, we need to consider alternative treatment options.  Today, I will obtain laboratory studies for risk stratification to determine which high efficacy therapies would be safe for him, to include hepatic panel, hepatitis B and C serologies, varicella serology, HIV serology, QuantiFERON screen for tuberculosis, total antibody (IgG, IgA and IgM) levels and LESA virus serology.    I will arrange to  see him back in 2 to 3 weeks to go over these results and decide on an ongoing plan of care.    2. Gait disturbance  The patient clearly needs to continue physical therapy to try to return to his baseline level of function.  I provided him with a written referral to physical therapy, which he will pursue in his home community.    At present, with the assistance of his family, he does not feel unsafe in his home environment.  If he were to worsen to a point where he did not feel that he could safely remain at home, we would need to pursue admission to the [facility] for rescue therapy with plasma exchange.    I spent a total of 51 minutes of patient care activities related to this encounter on the date of service, including time spent reviewing the chart, obtaining history and examination from and in counseling the patient, and in coordination of care via the electronic medical record.

## 2023-07-18 LAB
HBV CORE AB SERPL QL IA: NONREACTIVE
HBV SURFACE AG SERPL QL IA: NONREACTIVE
HIV 1+2 AB+HIV1 P24 AG SERPL QL IA: REACTIVE
HIV 1+2 AB+HIV1P24 AG SERPLBLD IA.RAPID: ABNORMAL
HIV 2 AB SERPLBLD QL IA.RAPID: NONREACTIVE
HIV1 AB SERPLBLD QL IA.RAPID: ABNORMAL
IGA SERPL-MCNC: 112 MG/DL (ref 84–499)
IGG SERPL-MCNC: 653 MG/DL (ref 610–1616)
IGM SERPL-MCNC: 49 MG/DL (ref 35–242)
VZV IGG SER QL IA: 973.5 INDEX
VZV IGG SER QL IA: POSITIVE

## 2023-07-19 LAB
GAMMA INTERFERON BACKGROUND BLD IA-ACNC: 0 IU/ML
HIV1 RNA # PLAS NAA DL=20: NOT DETECTED COPIES/ML
M TB IFN-G BLD-IMP: NEGATIVE
M TB IFN-G CD4+ BCKGRND COR BLD-ACNC: 1 IU/ML
MITOGEN IGNF BCKGRD COR BLD-ACNC: 0 IU/ML
MITOGEN IGNF BCKGRD COR BLD-ACNC: 0 IU/ML
QUANTIFERON MITOGEN: 1 IU/ML
QUANTIFERON NIL TUBE: 0 IU/ML
QUANTIFERON TB1 TUBE: 0 IU/ML
QUANTIFERON TB2 TUBE: 0

## 2023-07-26 LAB — SCANNED LAB RESULT: ABNORMAL

## 2023-08-01 ENCOUNTER — TELEPHONE (OUTPATIENT)
Dept: NEUROLOGY | Facility: CLINIC | Age: 43
End: 2023-08-01
Payer: COMMERCIAL

## 2023-08-01 NOTE — TELEPHONE ENCOUNTER
Patient called back and writer let him know that the MRI orders had been sent. Patient stated understanding.

## 2023-08-01 NOTE — TELEPHONE ENCOUNTER
[facility] Call Center    Phone Message    May a detailed message be left on voicemail: yes     Reason for Call: Order(s): Other:   Reason for requested: MRI  Date needed: asap  Provider name: Dr. Agarwal    Pt called requesting MRI order be sent to [facility] please fax# [phone]    Please call Pt back [phone] to confirm.    Action Taken: Message routed to:  Other: [facility]    Travel Screening: Not Applicable

## 2023-08-01 NOTE — TELEPHONE ENCOUNTER
Faxed MRI orders to [facility] per patient preference. Fax receipt verified via rightfax.

## 2023-08-04 ENCOUNTER — DOCUMENTATION ONLY (OUTPATIENT)
Dept: NEUROLOGY | Facility: CLINIC | Age: 43
End: 2023-08-04
Payer: COMMERCIAL

## 2023-08-04 NOTE — PROGRESS NOTES
MRI reports received from [facility], reports placed in Dr. Agarwal's folder for review and images are being pushed to [facility].  Jose Gordon EMT 08/04/2023 12:03PM

## 2023-08-07 ENCOUNTER — VIRTUAL VISIT (OUTPATIENT)
Dept: NEUROLOGY | Facility: CLINIC | Age: 43
End: 2023-08-07
Payer: COMMERCIAL

## 2023-08-07 ENCOUNTER — TELEPHONE (OUTPATIENT)
Dept: NEUROLOGY | Facility: CLINIC | Age: 43
End: 2023-08-07

## 2023-08-07 DIAGNOSIS — G35 MULTIPLE SCLEROSIS EXACERBATION (H): Primary | ICD-10-CM

## 2023-08-07 PROCEDURE — 99215 OFFICE O/P EST HI 40 MIN: CPT | Mod: VID | Performed by: PSYCHIATRY & NEUROLOGY

## 2023-08-07 RX ORDER — PREDNISONE 50 MG/1
TABLET ORAL
Qty: 125 TABLET | Refills: 0 | Status: SHIPPED | OUTPATIENT
Start: 2023-08-07

## 2023-08-07 RX ORDER — FAMOTIDINE 40 MG/1
1 TABLET, FILM COATED ORAL EVERY MORNING
COMMUNITY
Start: 2023-08-02 | End: 2024-08-01

## 2023-08-07 RX ORDER — ACETAMINOPHEN 325 MG/1
650 TABLET ORAL ONCE
Status: CANCELLED | OUTPATIENT
Start: 2023-08-07

## 2023-08-07 RX ORDER — ALBUTEROL SULFATE 90 UG/1
1-2 AEROSOL, METERED RESPIRATORY (INHALATION)
Status: CANCELLED
Start: 2023-08-07

## 2023-08-07 RX ORDER — MEPERIDINE HYDROCHLORIDE 25 MG/ML
25 INJECTION INTRAMUSCULAR; INTRAVENOUS; SUBCUTANEOUS EVERY 30 MIN PRN
Status: CANCELLED | OUTPATIENT
Start: 2023-08-07

## 2023-08-07 RX ORDER — METHYLPREDNISOLONE SODIUM SUCCINATE 125 MG/2ML
125 INJECTION, POWDER, LYOPHILIZED, FOR SOLUTION INTRAMUSCULAR; INTRAVENOUS ONCE
Status: CANCELLED | OUTPATIENT
Start: 2023-08-07

## 2023-08-07 RX ORDER — HEPARIN SODIUM (PORCINE) LOCK FLUSH IV SOLN 100 UNIT/ML 100 UNIT/ML
5 SOLUTION INTRAVENOUS
Status: CANCELLED | OUTPATIENT
Start: 2023-08-07

## 2023-08-07 RX ORDER — EPINEPHRINE 1 MG/ML
0.3 INJECTION, SOLUTION INTRAMUSCULAR; SUBCUTANEOUS EVERY 5 MIN PRN
Status: CANCELLED | OUTPATIENT
Start: 2023-08-07

## 2023-08-07 RX ORDER — PANTOPRAZOLE SODIUM 40 MG/1
1 TABLET, DELAYED RELEASE ORAL
COMMUNITY
Start: 2023-08-02 | End: 2024-08-01

## 2023-08-07 RX ORDER — ALBUTEROL SULFATE 0.83 MG/ML
2.5 SOLUTION RESPIRATORY (INHALATION)
Status: CANCELLED | OUTPATIENT
Start: 2023-08-07

## 2023-08-07 RX ORDER — HEPARIN SODIUM,PORCINE 10 UNIT/ML
5-20 VIAL (ML) INTRAVENOUS DAILY PRN
Status: CANCELLED | OUTPATIENT
Start: 2023-08-07

## 2023-08-07 RX ORDER — DIPHENHYDRAMINE HCL 25 MG
50 CAPSULE ORAL ONCE
Status: CANCELLED | OUTPATIENT
Start: 2023-08-07

## 2023-08-07 RX ORDER — METHYLPREDNISOLONE SODIUM SUCCINATE 125 MG/2ML
125 INJECTION, POWDER, LYOPHILIZED, FOR SOLUTION INTRAMUSCULAR; INTRAVENOUS
Status: CANCELLED
Start: 2023-08-07

## 2023-08-07 RX ORDER — DIPHENHYDRAMINE HYDROCHLORIDE 50 MG/ML
50 INJECTION INTRAMUSCULAR; INTRAVENOUS
Status: CANCELLED
Start: 2023-08-07

## 2023-08-07 NOTE — PROGRESS NOTES
Jovan is a 42 year old who is being evaluated via a billable video visit.      How would you like to obtain your AVS? MyChart  If the video visit is dropped, the invitation should be resent by: Send to e-mail at: [email or web address]  Will anyone else be joining your video visit? No    Referral source: Established patient    Chief complaint: Multiple sclerosis    History of the Present Illness: Mr. Jovan Cohen is a 42 year old man who is evaluated in the Multiple Sclerosis Clinic today for follow-up regarding his diagnosis of multiple sclerosis.    At the request of the patient, today's visit was conducted via telemedicine (video visit).  He was joined on the visit today by his father, who is sitting beside him on the call.    The patient's history is as per my previous notes.  He has a history of symptoms of demyelinating disease likely dating back to the late 1990s, when he developed unexplained pain in the distal fingers.  In 2002 he had an episode of vertigo with vertical nystagmus.  Multiple sclerosis was formerly diagnosed in 2004 when he had unsteady walking and double vision.  He has previously been treated with glatiramer acetate and fingolimod, and most recently has been on dimethyl fumarate since around 2015.    Unfortunately, he has had a nicole course over the past few months.  He presented to an outside emergency department in July with complaints of left-sided weakness, incoordination and gait disturbance.  MRI scan of the brain demonstrated multiple enhancing lesions.  He was treated with high-dose steroids.  Following discharge from the hospital, he followed up with me on 7/17/2023.  I told the patient that we would need to change disease modifying therapies and performed laboratory studies for risk stratification regarding these possibilities.    Subsequently, he let us know that he was now experiencing right-sided weakness.  He is also complaining of severe, throbbing headaches.    I ordered  "an updated MRI scan of the brain that was completed at an outside facility on 8/3/2023.  I reviewed these images today, and the following is my independent interpretation.  The MRI scan of the brain demonstrates multiple enhancing lesions, some of which have an open ring pattern that is highly suggestive of acute demyelination.  The comparison MRI from July is not loaded onto our system, but per the interpretation of the outside radiologist, there has been some improvement in the appearance of previously noted enhancing lesions but also appearance of new areas of contrast enhancement, presumed to represent active inflammatory demyelination.    Symptomatically, regarding mobility the patient reports he is \"hanging in there\".  He has not fallen since we last spoke but feels that he is at risk of doing so.  He has not yet established care with a physical therapist.    Investigations: I reviewed the results of laboratory studies performed on the date of the patient's previous visit.  Hepatitis B serologies and QuantiFERON screen for tuberculosis were negative. Total antibody levels (IgG, IgA and IgM) were normal.  Varicella zoster IgG was present.  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    I should note that initial HIV antigen/antibody screen was reactive.  Confirmatory antibody testing was indeterminate, but HIV viral RNA was undetectable.  This presumably reflects a false positive result on first line HIV test, which are occasionally seen in the setting of autoimmune disease.    Assessment/plan:    1. Multiple sclerosis  The patient continues to experience highly active inflammatory demyelinating disease.  At this time, I am going to give him another course of high-dose steroids with prednisone 1250 mg daily for 5 days.  We will check in with him next week to see how he is doing.    He clearly needs to change disease modifying therapies and given the very active nature of his disease recently, I " would favor a highly effective therapy.  Because of his LESA virus serostatus, he is not a good candidate for natalizumab.  I recommended to him that we start treatment with ocrelizumab.  I reviewed the standard infusion schedule of this medication, beginning with two 300 mg IV infusion  by 2 weeks, and then one 600 mg IV infusion every 6 months.  We discussed risks of this medication, including potential for increased vulnerability to infections and reduced efficacy of vaccines received while on this treatment.  After discussion, the patient is comfortable with plan to initiate this medication and we will have him complete the necessary forms online.    I am going to see him back in 3 months for a review.    Today's encounter is high in complexity given ongoing, severe exacerbation of multiple sclerosis affecting multiple life functions including gait, and the amount of data reviewed is also high including above laboratory studies and independent interpretation of MRI imaging.    Video-Visit Details    Type of service:  Video Visit    Video Start Time: 2:30 pm    Video End Time: 2:52 pm    Originating Location (pt. Location): Home    Distant Location (provider location): On-site ([facility], [facility] [address], [address] [place] [address].    Platform used for Video Visit: Sera

## 2023-08-07 NOTE — TELEPHONE ENCOUNTER
Pt to start Ocrevus after virtual visit with provider today. Provider start form submitted via [employer] website and pt informed that a patient consent form needs to be signed. My chart message sent to pt explaining that patient consent form was sent to his email address which will need to be signed electronically.     Therapy plan pended for provider to review and sign. Orders will also need to be manually signed by Dr. Agarwal and then sent to [facility] in [place]. Writer will follow-up with pt if any updates.         Pao Arreola, RNCC  Neurology/Neurosurgery

## 2023-08-07 NOTE — PATIENT INSTRUCTIONS
We will treat you with prednisone, twenty-five 50 mg tabs daily for 5 days    2.   We will request approval for Ocrevus from your insurance company and try to get this started as soon as possible    3.   Return to clinic in 3 months

## 2023-08-07 NOTE — LETTER
8/7/2023        RE: Jovan Cohen  [address]  [facility] [address]    Referral source: Established patient    Chief complaint: Multiple sclerosis    History of the Present Illness: Mr. Jovan Cohen is a 42 year old man who is evaluated in the Multiple Sclerosis Clinic today for follow-up regarding his diagnosis of multiple sclerosis.    At the request of the patient, today's visit was conducted via telemedicine (video visit).  He was joined on the visit today by his father, who is sitting beside him on the call.    The patient's history is as per my previous notes.  He has a history of symptoms of demyelinating disease likely dating back to the late 1990s, when he developed unexplained pain in the distal fingers.  In 2002 he had an episode of vertigo with vertical nystagmus.  Multiple sclerosis was formerly diagnosed in 2004 when he had unsteady walking and double vision.  He has previously been treated with glatiramer acetate and fingolimod, and most recently has been on dimethyl fumarate since around 2015.    Unfortunately, he has had a nicole course over the past few months.  He presented to an outside emergency department in July with complaints of left-sided weakness, incoordination and gait disturbance.  MRI scan of the brain demonstrated multiple enhancing lesions.  He was treated with high-dose steroids.  Following discharge from the hospital, he followed up with me on 7/17/2023.  I told the patient that we would need to change disease modifying therapies and performed laboratory studies for risk stratification regarding these possibilities.    Subsequently, he let us know that he was now experiencing right-sided weakness.  He is also complaining of severe, throbbing headaches.    I ordered an updated MRI scan of the brain that was completed at an outside facility on 8/3/2023.  I reviewed these images today, and the following is my independent interpretation.  The MRI scan of the brain  "demonstrates multiple enhancing lesions, some of which have an open ring pattern that is highly suggestive of acute demyelination.  The comparison MRI from July is not loaded onto our system, but per the interpretation of the outside radiologist, there has been some improvement in the appearance of previously noted enhancing lesions but also appearance of new areas of contrast enhancement, presumed to represent active inflammatory demyelination.    Symptomatically, regarding mobility the patient reports he is \"hanging in there\".  He has not fallen since we last spoke but feels that he is at risk of doing so.  He has not yet established care with a physical therapist.    Investigations: I reviewed the results of laboratory studies performed on the date of the patient's previous visit.  Hepatitis B serologies and QuantiFERON screen for tuberculosis were negative. Total antibody levels (IgG, IgA and IgM) were normal.  Varicella zoster IgG was present.  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    I should note that initial HIV antigen/antibody screen was reactive.  Confirmatory antibody testing was indeterminate, but HIV viral RNA was undetectable.  This presumably reflects a false positive result on first line HIV test, which are occasionally seen in the setting of autoimmune disease.    Assessment/plan:    1. Multiple sclerosis  The patient continues to experience highly active inflammatory demyelinating disease.  At this time, I am going to give him another course of high-dose steroids with prednisone 1250 mg daily for 5 days.  We will check in with him next week to see how he is doing.    He clearly needs to change disease modifying therapies and given the very active nature of his disease recently, I would favor a highly effective therapy.  Because of his LESA virus serostatus, he is not a good candidate for natalizumab.  I recommended to him that we start treatment with ocrelizumab.  I reviewed " the standard infusion schedule of this medication, beginning with two 300 mg IV infusion  by 2 weeks, and then one 600 mg IV infusion every 6 months.  We discussed risks of this medication, including potential for increased vulnerability to infections and reduced efficacy of vaccines received while on this treatment.  After discussion, the patient is comfortable with plan to initiate this medication and we will have him complete the necessary forms online.    I am going to see him back in 3 months for a review.    Today's encounter is high in complexity given ongoing, severe exacerbation of multiple sclerosis affecting multiple life functions including gait, and the amount of data reviewed is also high including above laboratory studies and independent interpretation of MRI imaging.    Ubaldo Agarwal MD   of Neurology  [facility]    Cc:  Benjamin Cee MD (PCP)  Patient

## 2023-08-07 NOTE — Clinical Note
8/7/2023         RE: Jovan Cohen  [address]  [facility] [address]        Dear Colleague,    Thank you for referring your patient, Jovan Cohen, to the [facility] [place]. Please see a copy of my visit note below.    Jovan is a 42 year old who is being evaluated via a billable video visit.      How would you like to obtain your AVS? MyChart  If the video visit is dropped, the invitation should be resent by: Send to e-mail at: [email or web address]  Will anyone else be joining your video visit? No    Referral source: Established patient    Chief complaint: Multiple sclerosis    History of the Present Illness: Mr. Jovan Cohen is a 42 year old man who is evaluated in the Multiple Sclerosis Clinic today for follow-up regarding his diagnosis of multiple sclerosis.    At the request of the patient, today's visit was conducted via telemedicine (video visit).  He was joined on the visit today by his father, who is sitting beside him on the call.    The patient's history is as per my previous notes.  He has a history of symptoms of demyelinating disease likely dating back to the late 1990s, when he developed unexplained pain in the distal fingers.  In 2002 he had an episode of vertigo with vertical nystagmus.  Multiple sclerosis was formerly diagnosed in 2004 when he had unsteady walking and double vision.  He has previously been treated with glatiramer acetate and fingolimod, and most recently has been on dimethyl fumarate since around 2015.    Unfortunately, he has had a nicole course over the past few months.  He presented to an outside emergency department in July with complaints of left-sided weakness, incoordination and gait disturbance.  MRI scan of the brain demonstrated multiple enhancing lesions.  He was treated with high-dose steroids.  Following discharge from the hospital, he followed up with me on 7/17/2023.  I told the patient that we would need to change  "disease modifying therapies and performed laboratory studies for risk stratification regarding these possibilities.    Subsequently, he let us know that he was now experiencing right-sided weakness.  He is also complaining of severe, throbbing headaches.    I ordered an updated MRI scan of the brain that was completed at an outside facility on 8/3/2023.  I reviewed these images today, and the following is my independent interpretation.  The MRI scan of the brain demonstrates multiple enhancing lesions, some of which have an open ring pattern that is highly suggestive of acute demyelination.  The comparison MRI from July is not loaded onto our system, but per the interpretation of the outside radiologist, there has been some improvement in the appearance of previously noted enhancing lesions but also appearance of new areas of contrast enhancement, presumed to represent active inflammatory demyelination.    Symptomatically, regarding mobility the patient reports he is \"hanging in there\".  He has not fallen since we last spoke but feels that he is at risk of doing so.  He has not yet established care with a physical therapist.    Investigations: I reviewed the results of laboratory studies performed on the date of the patient's previous visit.  Hepatitis B serologies and QuantiFERON screen for tuberculosis were negative. Total antibody levels (IgG, IgA and IgM) were normal.  Varicella zoster IgG was present.  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    I should note that initial HIV antigen/antibody screen was reactive.  Confirmatory antibody testing was indeterminate, but HIV viral RNA was undetectable.  This presumably reflects a false positive result on first line HIV test, which are occasionally seen in the setting of autoimmune disease.    Assessment/plan:    1. Multiple sclerosis  The patient continues to experience highly active inflammatory demyelinating disease.  At this time, I am going " to give him another course of high-dose steroids with prednisone 1250 mg daily for 5 days.  We will check in with him next week to see how he is doing.    He clearly needs to change disease modifying therapies and given the very active nature of his disease recently, I would favor a highly effective therapy.  Because of his LESA virus serostatus, he is not a good candidate for natalizumab.  I recommended to him that we start treatment with ocrelizumab.  I reviewed the standard infusion schedule of this medication, beginning with two 300 mg IV infusion  by 2 weeks, and then one 600 mg IV infusion every 6 months.  We discussed risks of this medication, including potential for increased vulnerability to infections and reduced efficacy of vaccines received while on this treatment.  After discussion, the patient is comfortable with plan to initiate this medication and we will have him complete the necessary forms online.    I am going to see him back in 3 months for a review.    Today's encounter is high in complexity given ongoing, severe exacerbation of multiple sclerosis affecting multiple life functions including gait, and the amount of data reviewed is also high including above laboratory studies and independent interpretation of MRI imaging.    Video-Visit Details    Type of service:  Video Visit    Video Start Time: 2:30 pm    Video End Time: 2:52 pm    Originating Location (pt. Location): Home    Distant Location (provider location): On-site ([facility], [facility], [address] [place] [address].    Platform used for Video Visit: Sera      Again, thank you for allowing me to participate in the care of your patient.        Sincerely,        Ubaldo Agarwal MD

## 2023-08-08 DIAGNOSIS — G35 MULTIPLE SCLEROSIS EXACERBATION (H): ICD-10-CM

## 2023-08-08 RX ORDER — PREDNISONE 50 MG/1
TABLET ORAL
Qty: 125 TABLET | Refills: 0 | OUTPATIENT
Start: 2023-08-08

## 2023-08-08 NOTE — TELEPHONE ENCOUNTER
Refill declined. Pt is only to take this for 5 days.       Pao Arreola, RNCC  Neurology/Neurosurgery

## 2023-08-08 NOTE — TELEPHONE ENCOUNTER
Pt has yet to review my chart message regarding pt consent form. VM box full so writer unable to leave VM. Will try again later.     Ocrevus orders electronically and manually singed by provider. Facesheet, OV note, and orders faxed to [facility] scheduling. Confirmed fax was sent successfully. Writer will follow-up to ensure they have received orders later today.     Fax: [phone]      Pao Arreola, RNCC  Neurology/Neurosurgery

## 2023-08-08 NOTE — TELEPHONE ENCOUNTER
Pt returned writer's phone call and was able to complete [employer] Patient Consent Form via email that was sent to him. Patient confirmed that he has started the prednisone yesterday. Writer will follow-up with him in a week. Pt was also informed that Ocrevus orders were sent to [facility] so he should receive a call for scheduling once this has been approved by insurance.         Pao Arreola, RNCC  Neurology/Neurosurgery

## 2023-08-08 NOTE — TELEPHONE ENCOUNTER
Confirmed with infusion , Margarita, at [facility] that they have received Ocrevus orders.     Writer attempted to reach patient again to provide an update. VM box full and my chart has yet to be seen. Will try again later.       Pao Arreola, RNCC  Neurology/Neurosurgery

## 2023-08-10 NOTE — TELEPHONE ENCOUNTER
[facility] Call Center    Phone Message    May a detailed message be left on voicemail: yes     Reason for Call: Other: Wendy with [facility] is calling because there are specific directions in case of a reaction, but they are wondering if they can use their own reaction protocols. Please call Wendy to discuss     Action Taken: Message routed to:  [facility] AYESHA MS    Travel Screening: Not Applicable

## 2023-08-10 NOTE — TELEPHONE ENCOUNTER
Wendy reports that their hypersensitivity protocol is similar but may be slightly different in the order of medications. Generally, they would give benadryl, then solumedrol 125mg if pt experiences any hives, and if prolonged symptoms, then they will administer the epinephrine. Infusion pharmacist reports that all their infusion nurses follow these standard protocols so it may be best to use their protocols if possible. Writer provided TORB to pharmacist that they may use their own hypersensitivity reaction protocols.       Pao Arreola, RNCC  Neurology/Neurosurgery

## 2023-08-15 ENCOUNTER — TELEPHONE (OUTPATIENT)
Dept: NEUROLOGY | Facility: CLINIC | Age: 43
End: 2023-08-15
Payer: COMMERCIAL

## 2023-08-15 NOTE — TELEPHONE ENCOUNTER
Second attempt to reach patient. Writer left detailed message requesting patient call back.       Pao Arreola, RNCC  Neurology/Neurosurgery

## 2023-08-15 NOTE — LETTER
2 second pause August 22, 2023      Reg: Jovan Cohen  [address]   [facility] [address]        To Whom It May Concern,         Jovan Cohen is a patient of mine seen at our [facility]. I am writing in regards to the denial of ocrelizumab (Orevus) and request that this decision be reconsidered. Ocrevus will not be used in combination with dimethyl fumarate (Tecfidera) but rather as a single agent therapy for multiple sclerosis. The patient is instructed to stop dimethyl fumarate upon starting his Ocrevus infusions. Please expedite this appeal to avoid further delay in care. Thank you for your time and consideration.           Sincerely,        Ubaldo Agarwal MD  [facility]

## 2023-08-15 NOTE — TELEPHONE ENCOUNTER
Writer unable to reach patient. Left detailed message requesting patient call back to discuss.     Writer also discussed with [facility]. Approval for Ocrevus is still under review and has not been approved yet. Requested this be expedited if possible. They will send another message to their PA team to look into this today.         Ubaldo Agarwal MD Yang, Allison, RN  Please follow up on the following items:    1) Has the patient noted improvement in his symptoms after course of high dose steroids last week? If he is not doing better, I would offer hospital admission to the [facility] for plasma exchange. This would be a 7-10 day admission, but could expedite recovery.    2) Any word on approval or scheduling of Ocrevus at [facility]?    Thanks    Will Stefano

## 2023-08-17 NOTE — TELEPHONE ENCOUNTER
Writer left detailed message requesting patient call back to discuss how is has been doing after steroid treatment last week. Writer also inquired with [facility] and they are still waiting on authorization from insurance.       Pao Arreola RNCC  Neurology/Neurosurgery

## 2023-08-18 NOTE — TELEPHONE ENCOUNTER
"Writer contacted [facility] who reported Ocrevus infusion authorization is still pending. Requested that they send message to authorization team to expedite this again. A message was sent. Writer will call pt's insurance plan regarding this as well.     Writer contacted patient this morning to follow-up. He reports feeling slightly better after completing steroid treatment. His headaches have slightly improved and he \"does not have to apply ice as often.\" He continues to have balance concerns, weakness on his right side, some numbness on the left side, heaviness in his legs, and some difficulty with his vision. However, he endorses that the severity in these symptoms have slightly improved and he is \"not falling over as much.\" Writer inquired if he would be open to hospital admission at the [facility] [place] for plasma exchange if recommended by provider and if his symptoms should worsen. Pt expressed some hesitation and understands this would be at least 7-10 day inpatient stay. He would like to discuss with his mother today and expressed some difficulty determining/gauging the severity in his symptoms and would like to think about it further. He will call writer back with his thoughts. Routed to provider.         Pao Arreola, RNCC  Neurology/Neurosurgery       "

## 2023-08-18 NOTE — TELEPHONE ENCOUNTER
"Denial letter received from insurance.     Denial reason: \"Documentation has shown that Ocrevus will be used in combination with Tecfidera (dimethyl fumarate). Ocrevus is to be used as a single agent therapy for multiple sclerosis (not to be used with another multiple sclerosis disease modifying agent).\"    Routed to provider to advise. Letter to be generated and will be submitted to appeal office. Writer will also request that this review be expedited.       Pao Arreola, RNCC  Neurology/Neurosurgery       "

## 2023-08-18 NOTE — TELEPHONE ENCOUNTER
[employer] reports that pt's Ocrevus was denied. Per representative, pt did not meet medical criteria according to the information/documentation that was received. Appeal options were sent out to patient and provider regarding the denial but [facility] has not received this. Appeal/P2P options should be available. Writer requested that denial letter be faxed to [facility] to this can be reviewed. Writer will inquire with [employer] regarding patient assistance program if appeal is denied.     [facility] notified and also confirmed that this was denied. Clinic can reach out to infusion scheduling again if denial can be overturned and they can assist in scheduling.       Pao Arreola, RNCC  Neurology/Neurosurgery

## 2023-08-22 NOTE — TELEPHONE ENCOUNTER
Appeal letter generated stating that Ocrevus will be used as a single agent therapy and will not be used in combination with Tecfidera. Patient will stop Tecfidera (dimethyl fumarate) upon starting Ocrevus.     Appeal letter generated and faxed to appeals office at [phone]. Writer requested a for an expedited appeal to be reviewed in 72 hours. Confirmed fax was sent successfully.         Ubaldo Agarwal MD  You11 hours ago (8:29 PM)     WS  Correct. He will stop dimethyl fumarate upon starting Ocrevus.    Will Stefano Arreola RNCC  Neurology/Neurosurgery

## 2023-08-23 DIAGNOSIS — G35 MS (MULTIPLE SCLEROSIS) (H): ICD-10-CM

## 2023-08-23 DIAGNOSIS — R53.82 CHRONIC FATIGUE: ICD-10-CM

## 2023-08-23 RX ORDER — MODAFINIL 200 MG/1
TABLET ORAL
Qty: 60 TABLET | Refills: 5 | Status: SHIPPED | OUTPATIENT
Start: 2023-08-23 | End: 2024-03-18

## 2023-08-23 NOTE — CONFIDENTIAL NOTE
Medication: modafinil (PROVIGIL) 200 MG tablet   Sig: Take one tablet by mouth in the morning; can repeat in the early afternoon. Do not take after 2 pm   Date last written: 1/27/23  Dispensed amount: 60  Refills: 5    [facility]ed [facility]    Pt's last office visit: 8/7/23  Next scheduled office visit: 11/20/23      Per the RN/LPN medication refill protocol, writer is unable to refill this request.     Poly Vargas RN Care Coordinator   Neurology/Neurosurgery/PM&R/ Pain Management

## 2023-08-31 NOTE — TELEPHONE ENCOUNTER
Clinic has yet to receive an update regarding Ocrevus appeal. Writer contacted  insurance to follow-up and was transferred to appeals dept. No answer so writer had to leave a VM. Requested they call writer back with an update. Neurology office and writer's direct # left for call back.       Pao Arreola, RNCC  Neurology/Neurosurgery

## 2023-09-13 ENCOUNTER — TELEPHONE (OUTPATIENT)
Dept: NEUROLOGY | Facility: CLINIC | Age: 43
End: 2023-09-13
Payer: COMMERCIAL

## 2023-09-13 NOTE — TELEPHONE ENCOUNTER
[facility] Call Center    Phone Message    May a detailed message be left on voicemail: yes     Reason for Call: Other: Pt is requesting a call back regarding his infusions. Pt has some questions. Please call back at # [phone].      Action Taken: Message routed to:  [facility] Neurology     Travel Screening: Not Applicable

## 2023-09-13 NOTE — TELEPHONE ENCOUNTER
Previous note from Pao Arreola from 9/5/2023 indicates that the patient's next Ocrevus infusion is scheduled tomorrow on 9/14/2023. Please confirm this.    These symptoms are similar to those reported by the patient at his last visit on 8/7/2023. He is experiencing a lot of active inflammation at this time and this will take some time to get better. The goal of Ocrevus treatment is to make this stop happening, but this will not be instantaneous.     He has already received two five day courses of high dose steroids over the past 2 months and there are risks of giving him multiple courses of steroids in close succession.     He should keep us advised of any new symptoms.

## 2023-09-13 NOTE — CONFIDENTIAL NOTE
"Received a call from the patient who is experiencing the following neurologic changes:    Description of symptoms: The pt is calling to report that he is experiencing tingling on the left side of his body. This is most prominent in the left hand. He also has an extremely bad headache and pain on the right side of his head.     Onset: A few days ago  Continuous or intermittent?: Continuous   Are your symptoms getting progressively worse? \"Somewhat\"  Have you ever experienced these symptoms before? He has had tingling sensation but his previous relapses have been much worse.    Overheated or increased physical activity recently? No  Recent illness/infection or increased stress? Yes to increased stress but no to illness/infection  Symptoms of UTI (frequency, urgency, hesitation)? No  Are you currently on any medications for your MS? He just started Ocrevus and had his first infusion \"a couple weeks ago\" (unsure of the date) and was told that he needed to have another infusion but was never told the date. I have tried multiple time to reach the [facility] in [facility] but all of the numbers are down. I have tried the numbers listed below with no success. I have also asked another staff member to try and couldn't get through. We will try in the morning.     Phone  [phone]  Alternate Phone  [phone]    Last MRI: 8/4/23  Last appointment with neurologist: 11/20/23      Will forward to Dr Agarwal for review. I will try the infusion center again in the morning to find out when his next infusion is/was scheduled.     Poly Vargas RN Care Coordinator   Neurology/Neurosurgery/PM&R/ Pain Management         "

## 2023-09-14 NOTE — CONFIDENTIAL NOTE
RN was able to reach the infusion center and confirmed that he actually arrived for his Ocrevus infusion today. Yesterday he did not have any recollection of this appt or even when he had his last one.   I did reach out to the pt who was finishing up with his infusion. I explained Dr Agarwal's response and rationale stated below and he verbalized understanding. He will call with any further questions or concerns.    Poly Vargas RN Care Coordinator   Neurology/Neurosurgery/PM&R/ Pain Management

## 2023-09-29 ENCOUNTER — NURSE TRIAGE (OUTPATIENT)
Dept: NURSING | Facility: CLINIC | Age: 43
End: 2023-09-29
Payer: COMMERCIAL

## 2023-09-30 NOTE — TELEPHONE ENCOUNTER
Caller missed call from clinic. Review of the chart reveals a message regarding being seen in the ED for worsening headaches from the patients MD. This message was relayed to the patient.         Reason for Disposition   [1] Follow-up call to recent contact AND [2] information only call, no triage required    Protocols used: Information Only Call - No Triage-A-

## 2023-11-20 ENCOUNTER — TELEPHONE (OUTPATIENT)
Dept: NEUROLOGY | Facility: CLINIC | Age: 43
End: 2023-11-20

## 2023-11-20 ENCOUNTER — VIRTUAL VISIT (OUTPATIENT)
Dept: NEUROLOGY | Facility: CLINIC | Age: 43
End: 2023-11-20
Payer: COMMERCIAL

## 2023-11-20 DIAGNOSIS — G35 MS (MULTIPLE SCLEROSIS) (H): Primary | ICD-10-CM

## 2023-11-20 PROCEDURE — 99442 PR PHYSICIAN TELEPHONE EVALUATION 11-20 MIN: CPT | Mod: 93 | Performed by: PSYCHIATRY & NEUROLOGY

## 2023-11-20 NOTE — TELEPHONE ENCOUNTER
Faxed MRI orders to [facility] per patient preference. Fax receipt verified via rightfax.

## 2023-11-20 NOTE — PROGRESS NOTES
Referral source: Established patient    Chief complaint: Multiple sclerosis    History of the Present Illness: Mr. Jovan Cohen is a 43 year old man who is evaluated today for follow up regarding his diagnosis of multiple sclerosis.    At the request of the patient, today's appointment was scheduled via telemedicine (video visit).  However, despite multiple attempts we were unable to establish a functioning video connection with the patient, and the appointment was last converted to a telephone encounter.    The patient's history is as per my previous notes.  He has a history of symptoms of demyelinating disease likely dating back to the late 1990s, when he developed unexplained pain in the distal fingers.  In 2002 he had an episode of vertigo with vertical nystagmus.  Multiple sclerosis was formerly diagnosed in 2004 when he had unsteady walking and double vision.  He has previously been treated with glatiramer acetate and fingolimod, and most recently had been on dimethyl fumarate until earlier this year.  Unfortunately, in July he developed left-sided weakness, incoordination and gait disturbance and was found to have multiple enhancing lesions on the brain MRI.  He was treated with a course of high-dose steroids.  Subsequently, he developed new weakness on the right side and was found to have further radiologic progression, and was treated with a second course of high-dose prednisone.    We then changed disease-modifying therapy to ocrelizumab, with the initial doses received on 8/31 and 9/14/2023.  Shortly after the infusion, the patient had contacted us with concerns about dizziness as well as new left-sided tingling affecting the left arm and torso.  My sense was that it was too early to determine whether ocrelizumab was working for him or not, and did offer him the option of presenting to the [facility] for evaluation for admission and plasmapheresis as  "rescue therapy. The patient elected to remain in the outpatient setting.    Today, he reports, \"I'm OK\".  He is not experiencing as much dizziness with standing up, although left-sided tingling is still noticeable.  He has been undergoing physical and occupational therapy services.  He was recently discharged from physical therapy but is still engaged in occupational therapy.    Assessment/plan:    1. Multiple sclerosis  I discussed with the patient that it is my hope that the recent breakthrough inflammation will stabilize on ocrelizumab.  He is currently scheduled for another appointment here in February 2024.  I would like him to keep that appointment, ideally in person.  Prior to the visit, we will obtain MRI scans of the brain and cervical spine to establish a new radiographic baseline for future reference and to rule out any evidence of ongoing active demyelination.    If these images are stable, we will next proceed with the first maintenance ocrelizumab infusion, which would be due on 3/14/2024.  We will determine the plan in that regard at the time of his next visit.    Telephone visit    Duration of call: 11 minutes  "

## 2023-11-20 NOTE — LETTER
11/20/2023      RE: Jovan Cohen  [address]  [facility] [address]     Referral source: Established patient    Chief complaint: Multiple sclerosis    History of the Present Illness: Mr. Jovan Cohen is a 43 year old man who is evaluated today for follow up regarding his diagnosis of multiple sclerosis.    At the request of the patient, today's appointment was scheduled via telemedicine (video visit).  However, despite multiple attempts we were unable to establish a functioning video connection with the patient, and the appointment was last converted to a telephone encounter.    The patient's history is as per my previous notes.  He has a history of symptoms of demyelinating disease likely dating back to the late 1990s, when he developed unexplained pain in the distal fingers.  In 2002 he had an episode of vertigo with vertical nystagmus.  Multiple sclerosis was formerly diagnosed in 2004 when he had unsteady walking and double vision.  He has previously been treated with glatiramer acetate and fingolimod, and most recently had been on dimethyl fumarate until earlier this year.  Unfortunately, in July he developed left-sided weakness, incoordination and gait disturbance and was found to have multiple enhancing lesions on the brain MRI.  He was treated with a course of high-dose steroids.  Subsequently, he developed new weakness on the right side and was found to have further radiologic progression, and was treated with a second course of high-dose prednisone.    We then changed disease-modifying therapy to ocrelizumab, with the initial doses received on 8/31 and 9/14/2023.  Shortly after the infusion, the patient had contacted us with concerns about dizziness as well as new left-sided tingling affecting the left arm and torso.  My sense was that it was too early to determine whether ocrelizumab was working for him or not, and did offer him the option of presenting to the [facility]   Emergency Eepartment for evaluation for admission and plasmapheresis as rescue therapy. The patient elected to remain in the outpatient setting.    Today, he reports, \"I'm OK\".  He is not experiencing as much dizziness with standing up, although left-sided tingling is still noticeable.  He has been undergoing physical and occupational therapy services.  He was recently discharged from physical therapy but is still engaged in occupational therapy.      Assessment/plan:    1. Multiple sclerosis  I discussed with the patient that it is my hope that the recent breakthrough inflammation will stabilize on ocrelizumab.  He is currently scheduled for another appointment here in February 2024.  I would like him to keep that appointment, ideally in person.  Prior to the visit, we will obtain MRI scans of the brain and cervical spine to establish a new radiographic baseline for future reference and to rule out any evidence of ongoing active demyelination.    If these images are stable, we will next proceed with the first maintenance ocrelizumab infusion, which would be due on 3/14/2024.  We will determine the plan in that regard at the time of his next visit.    Ubaldo Agarwal MD   of Neurology  [facility]    Cc:  Benjamin Cee MD (PCP)  Patient  "

## 2023-11-20 NOTE — Clinical Note
11/20/2023         RE: Jovan Cohen  [address]  [facility] [address]        Dear Colleague,    Thank you for referring your patient, Jovan Cohen, to the [facility] [place]. Please see a copy of my visit note below.    Referral source: Established patient    Chief complaint: Multiple sclerosis    History of the Present Illness: Mr. Jovan Cohen is a 43 year old man who is evaluated today for follow up regarding his diagnosis of multiple sclerosis.    At the request of the patient, today's appointment was scheduled via telemedicine (video visit).  However, despite multiple attempts we were unable to establish a functioning video connection with the patient, and the appointment was last converted to a telephone encounter.    The patient's history is as per my previous notes.  He has a history of symptoms of demyelinating disease likely dating back to the late 1990s, when he developed unexplained pain in the distal fingers.  In 2002 he had an episode of vertigo with vertical nystagmus.  Multiple sclerosis was formerly diagnosed in 2004 when he had unsteady walking and double vision.  He has previously been treated with glatiramer acetate and fingolimod, and most recently had been on dimethyl fumarate until earlier this year.  Unfortunately, in July he developed left-sided weakness, incoordination and gait disturbance and was found to have multiple enhancing lesions on the brain MRI.  He was treated with a course of high-dose steroids.  Subsequently, he developed new weakness on the right side and was found to have further radiologic progression, and was treated with a second course of high-dose prednisone.    We then changed disease-modifying therapy to ocrelizumab, with the initial doses received on 8/31 and 9/14/2023.  Shortly after the infusion, the patient had contacted us with concerns about dizziness as well as new left-sided tingling affecting the left arm and  "torso.  My sense was that it was too early to determine whether ocrelizumab was working for him or not, and did offer him the option of presenting to the [facility]   Emergency Eepartment for evaluation for admission and plasmapheresis as rescue therapy. The patient elected to remain in the outpatient setting.    Today, he reports, \"I'm OK\".  He is not experiencing as much dizziness with standing up, although left-sided tingling is still noticeable.  He has been undergoing physical and occupational therapy services.  He was recently discharged from physical therapy but is still engaged in occupational therapy.    Assessment/plan:    1. Multiple sclerosis  I discussed with the patient that it is my hope that the recent breakthrough inflammation will stabilize on ocrelizumab.  He is currently scheduled for another appointment here in February 2024.  I would like him to keep that appointment, ideally in person.  Prior to the visit, we will obtain MRI scans of the brain and cervical spine to establish a new radiographic baseline for future reference and to rule out any evidence of ongoing active demyelination.    If these images are stable, we will next proceed with the first maintenance ocrelizumab infusion, which would be due on 3/14/2024.  We will determine the plan in that regard at the time of his next visit.    Telephone visit    Duration of call: 11 minutes      Again, thank you for allowing me to participate in the care of your patient.        Sincerely,        Ubaldo Agarwal MD  "

## 2023-11-20 NOTE — PATIENT INSTRUCTIONS
We will send orders for MRI scans to [facility] in [facility], to be performed prior to your next appointment here on February 5, 2024

## 2024-02-04 ENCOUNTER — HEALTH MAINTENANCE LETTER (OUTPATIENT)
Age: 44
End: 2024-02-04

## 2024-02-05 ENCOUNTER — OFFICE VISIT (OUTPATIENT)
Dept: NEUROLOGY | Facility: CLINIC | Age: 44
End: 2024-02-05
Payer: COMMERCIAL

## 2024-02-05 VITALS — DIASTOLIC BLOOD PRESSURE: 78 MMHG | HEART RATE: 62 BPM | SYSTOLIC BLOOD PRESSURE: 138 MMHG

## 2024-02-05 DIAGNOSIS — R53.82 CHRONIC FATIGUE: ICD-10-CM

## 2024-02-05 DIAGNOSIS — R26.9 GAIT DISTURBANCE: ICD-10-CM

## 2024-02-05 DIAGNOSIS — G35 MULTIPLE SCLEROSIS (H): Primary | ICD-10-CM

## 2024-02-05 PROCEDURE — 99214 OFFICE O/P EST MOD 30 MIN: CPT | Performed by: PSYCHIATRY & NEUROLOGY

## 2024-02-05 RX ORDER — EPINEPHRINE 1 MG/ML
0.3 INJECTION, SOLUTION INTRAMUSCULAR; SUBCUTANEOUS EVERY 5 MIN PRN
OUTPATIENT
Start: 2024-03-14

## 2024-02-05 RX ORDER — ACETAMINOPHEN 325 MG/1
650 TABLET ORAL ONCE
OUTPATIENT
Start: 2024-03-14

## 2024-02-05 RX ORDER — DIPHENHYDRAMINE HYDROCHLORIDE 50 MG/ML
50 INJECTION INTRAMUSCULAR; INTRAVENOUS
Start: 2024-03-14

## 2024-02-05 RX ORDER — DIPHENHYDRAMINE HCL 25 MG
50 CAPSULE ORAL ONCE
OUTPATIENT
Start: 2024-03-14

## 2024-02-05 RX ORDER — METHYLPREDNISOLONE SODIUM SUCCINATE 125 MG/2ML
125 INJECTION, POWDER, LYOPHILIZED, FOR SOLUTION INTRAMUSCULAR; INTRAVENOUS ONCE
OUTPATIENT
Start: 2024-03-14

## 2024-02-05 RX ORDER — ALBUTEROL SULFATE 0.83 MG/ML
2.5 SOLUTION RESPIRATORY (INHALATION)
OUTPATIENT
Start: 2024-03-14

## 2024-02-05 RX ORDER — MEPERIDINE HYDROCHLORIDE 25 MG/ML
25 INJECTION INTRAMUSCULAR; INTRAVENOUS; SUBCUTANEOUS EVERY 30 MIN PRN
OUTPATIENT
Start: 2024-03-14

## 2024-02-05 RX ORDER — METHYLPREDNISOLONE SODIUM SUCCINATE 125 MG/2ML
125 INJECTION, POWDER, LYOPHILIZED, FOR SOLUTION INTRAMUSCULAR; INTRAVENOUS
Start: 2024-03-14

## 2024-02-05 RX ORDER — ALBUTEROL SULFATE 90 UG/1
1-2 AEROSOL, METERED RESPIRATORY (INHALATION)
Start: 2024-03-14

## 2024-02-05 NOTE — Clinical Note
"    2/5/2024         RE: Jovan Cohen  [address]  [facility] [address]        Dear Colleague,    Thank you for referring your patient, Jovan Cohen, to the [facility] [place]. Please see a copy of my visit note below.    Referral source: Established patient    Chief complaint: Multiple sclerosis    History of the Present Illness: Mr. Jovan Cohen is a 43 year old right-handed man who presents to the Multiple Sclerosis Clinic today for a scheduled follow up visit after earlier MRI scans of the brain and cervical spine.    The patient's history is as per my previous notes.  He has a history of symptoms of demyelinating disease likely dating back to the late 1990s, when he developed unexplained pain in the distal fingers.  In 2002 he had an episode of vertigo with vertical nystagmus.  Multiple sclerosis was formerly diagnosed in 2004 when he had unsteady walking and double vision.  He has previously been treated with glatiramer acetate and fingolimod, and most recently had been on dimethyl fumarate until 2023. Unfortunately, in July 2023, he developed left-sided weakness, incoordination and gait disturbance and was found to have multiple enhancing lesions on brain MRI.  He was treated with a course of high-dose steroids. Subsequently, he developed new weakness on the right side and was found to have further radiologic progression, and was treated with a second course of high-dose prednisone. We then changed disease-modifying therapy to ocrelizumab, with the initial doses received on 8/31 and 9/14/2023.    Today, he relates that he is \"better over time, somewhat\".  He does not relate any clear history of new, episodic changes suggestive of MS relapse since he was last seen approximately 3 months ago.    He relates that there is ongoing numbness and tingling in his left arm.    Symptomatically, he is taking modafinil 200 mg in the morning and sometimes in the afternoon for " fatigue, which he does perceive as helpful.    For walking, he is no longer taking dalfampridine as he indicates that he felt less dizzy off of that medication.    PHYSICAL EXAMINATION:  VITAL SIGNS: Blood pressure 138/78; pulse 62.  GENERAL: Well-nourished man who presents to the examination accompanied by his father, awake and alert and in no acute distress.    NEUROLOGIC EXAMINATION:  MENTAL STATUS: The patient is awake, alert and oriented.  He easily engages in conversation, although thought processes are tangential.  CRANIAL NERVES: Visual fields are full to confrontation.  Extraocular movements are intact with no internuclear ophthalmoplegia.  Facial strength is normal.  Palate elevation and tongue protrusion are normal.  POWER: Strength is within normal limits in proximal and distal muscles in the upper and lower limbs throughout.  REFLEXES: Reflexes are diffusely brisk.  MOTOR/CEREBELLAR: There is no appendicular ataxia on finger-to-nose testing.  Rapid alternating movements are within normal limits in the hands and fingers.  There is no pronator drift in the arms.  GAIT: The patient is able to ambulate on a flat, level surface with no gross loss of postural stability, and able to walk on heels, toes and in tandem.    Investigations:  I reviewed images of MRI scanse of the brain and cervical spine performed earlier on 12/15/2023, and the following is my independent interpretation of those images.     Overall, images demonstrate clear improvement in comparison to earlier MRI scans of 8/4/2023.  There is fading enhancement of a single lesion in the deep white matter of the right frontal lobe.  As imaging was completed less than 3 months after the initial dose of ocrelizumab, I would not regard the enhancement as evidence of treatment failure.  Other white matter lesions are stable in comparison to 8/4/2023.  Likewise, MRI scan of the cervical spine shows no abnormal enhancement with gadolinium contrast.   Multiple short segment foci of T2 hyperintensity are stable to improved in comparison to previous images from 8/4/2023.    Assessment/plan:    1. Multiple sclerosis  The patient's motor examination is significantly improved after initiation of high-efficacy disease modifying therapy with ocrelizumab.  There has also been substantial radiologic improvement from pre-treatment baseline, and given the recent start of ocrelizumab, I do not think that any changes seen on the current scans are indicative of treatment failure.      He will continue treatment with ocrelizumab, with the next maintenance infusion due to be performed on or about 3/14/2024.    I would like to see him back in 6 months for a review.    2. Gait disturbance  Report of increased dizziness with dalfampridine is not a common side effect of this medication, but as this is a purely symptomatic therapy with no disease modifying effect, I agree with stopping it.    We will continue to keep close eye on his gait symptoms over time.    3. Chronic fatigue  He notes clinical benefit from modafinil 200 mg once to twice daily, and we will continue that medication as well.        Again, thank you for allowing me to participate in the care of your patient.        Sincerely,        Ubaldo Agarwal MD

## 2024-02-05 NOTE — PATIENT INSTRUCTIONS
Proceed with next Ocrevus infusion on or about March 14, 2023    2.   Continue modafinil 200 mg in the morning and again in the early afternoon as needed (do not take after 2 pm)    3.   Return to clinic in 6 months

## 2024-02-05 NOTE — NURSING NOTE
Ocrevus maintenance dose therapy plan was faxed to [facility] in [facility]. Confirmation of successful delivery was received.    Poly Vargas RN Care Coordinator   Neurology/Neurosurgery/PM&R/ Pain Management

## 2024-03-03 NOTE — PROGRESS NOTES
"Referral source: Established patient    Chief complaint: Multiple sclerosis    History of the Present Illness: Mr. Jovan Cohen is a 43 year old right-handed man who presents to the Multiple Sclerosis Clinic today for a scheduled follow up visit after earlier MRI scans of the brain and cervical spine.    The patient's history is as per my previous notes.  He has a history of symptoms of demyelinating disease likely dating back to the late 1990s, when he developed unexplained pain in the distal fingers.  In 2002 he had an episode of vertigo with vertical nystagmus.  Multiple sclerosis was formerly diagnosed in 2004 when he had unsteady walking and double vision.  He has previously been treated with glatiramer acetate and fingolimod, and most recently had been on dimethyl fumarate until 2023. Unfortunately, in July 2023, he developed left-sided weakness, incoordination and gait disturbance and was found to have multiple enhancing lesions on brain MRI.  He was treated with a course of high-dose steroids. Subsequently, he developed new weakness on the right side and was found to have further radiologic progression, and was treated with a second course of high-dose prednisone. We then changed disease-modifying therapy to ocrelizumab, with the initial doses received on 8/31 and 9/14/2023.    Today, he relates that he is \"better over time, somewhat\".  He does not relate any clear history of new, episodic changes suggestive of MS relapse since he was last seen approximately 3 months ago.    He relates that there is ongoing numbness and tingling in his left arm.    Symptomatically, he is taking modafinil 200 mg in the morning and sometimes in the afternoon for fatigue, which he does perceive as helpful.    For walking, he is no longer taking dalfampridine as he indicates that he felt less dizzy off of that medication.    PHYSICAL EXAMINATION:  VITAL SIGNS: Blood pressure 138/78; pulse 62.  GENERAL: Well-nourished man who " presents to the examination accompanied by his father, awake and alert and in no acute distress.    NEUROLOGIC EXAMINATION:  MENTAL STATUS: The patient is awake, alert and oriented.  He easily engages in conversation, although thought processes are tangential.  CRANIAL NERVES: Visual fields are full to confrontation.  Extraocular movements are intact with no internuclear ophthalmoplegia.  Facial strength is normal.  Palate elevation and tongue protrusion are normal.  POWER: Strength is within normal limits in proximal and distal muscles in the upper and lower limbs throughout.  REFLEXES: Reflexes are diffusely brisk.  MOTOR/CEREBELLAR: There is no appendicular ataxia on finger-to-nose testing.  Rapid alternating movements are within normal limits in the hands and fingers.  There is no pronator drift in the arms.  GAIT: The patient is able to ambulate on a flat, level surface with no gross loss of postural stability, and able to walk on heels, toes and in tandem.    Investigations:  I reviewed images of MRI scanse of the brain and cervical spine performed earlier on 12/15/2023, and the following is my independent interpretation of those images.     Overall, images demonstrate clear improvement in comparison to earlier MRI scans of 8/4/2023.  There is fading enhancement of a single lesion in the deep white matter of the right frontal lobe.  As imaging was completed less than 3 months after the initial dose of ocrelizumab, I would not regard the enhancement as evidence of treatment failure.  Other white matter lesions are stable in comparison to 8/4/2023.  Likewise, MRI scan of the cervical spine shows no abnormal enhancement with gadolinium contrast.  Multiple short segment foci of T2 hyperintensity are stable to improved in comparison to previous images from 8/4/2023.    Assessment/plan:    1. Multiple sclerosis  The patient's motor examination is significantly improved after initiation of high-efficacy disease  modifying therapy with ocrelizumab.  There has also been substantial radiologic improvement from pre-treatment baseline, and given the recent start of ocrelizumab, I do not think that any changes seen on the current scans are indicative of treatment failure.      He will continue treatment with ocrelizumab, with the next maintenance infusion due to be performed on or about 3/14/2024.    I would like to see him back in 6 months for a review.    2. Gait disturbance  Report of increased dizziness with dalfampridine is not a common side effect of this medication, but as this is a purely symptomatic therapy with no disease modifying effect, I agree with stopping it.    We will continue to keep close eye on his gait symptoms over time.    3. Chronic fatigue  He notes clinical benefit from modafinil 200 mg once to twice daily, and we will continue that medication as well.

## 2024-03-05 ENCOUNTER — TELEPHONE (OUTPATIENT)
Dept: NEUROSURGERY | Facility: CLINIC | Age: 44
End: 2024-03-05
Payer: COMMERCIAL

## 2024-03-05 NOTE — TELEPHONE ENCOUNTER
----- Message from Ubaldo Agarwal MD sent at 3/5/2024  2:09 PM CST -----  Please call this patient and remind him that his next Ocrevus infusion is due on or about 3/14/2024. Scheduling information was sent to him in a [employer] message recently but he has not read it.    Thanks    Henry Agarwal MD

## 2024-03-05 NOTE — TELEPHONE ENCOUNTER
I called patient to give him the message below from Dr. Wang. When I read him the message patient informed me that he had his Ocrevus infusion today(03/05/2024) at [facility], Pt also stated that he is scheduled for his next infusion on 11/03/2024.

## 2024-03-13 ENCOUNTER — MYC MEDICAL ADVICE (OUTPATIENT)
Dept: NEUROLOGY | Facility: CLINIC | Age: 44
End: 2024-03-13
Payer: COMMERCIAL

## 2024-03-13 ENCOUNTER — TELEPHONE (OUTPATIENT)
Dept: PALLIATIVE MEDICINE | Facility: CLINIC | Age: 44
End: 2024-03-13
Payer: COMMERCIAL

## 2024-03-13 NOTE — TELEPHONE ENCOUNTER
Writer received faxes of lab reports for patient.  Records placed in provider folder for review.  Copy sent to [facility] for scan.    FELICIA Dunn., LUZ ([facility])

## 2024-03-14 ENCOUNTER — TRANSFERRED RECORDS (OUTPATIENT)
Dept: HEALTH INFORMATION MANAGEMENT | Facility: CLINIC | Age: 44
End: 2024-03-14
Payer: COMMERCIAL

## 2024-03-15 NOTE — TELEPHONE ENCOUNTER
I reviewed outside MRI imaging, which shows no evidence of interval or active demyelination. He should remain on Ocrevus at the usual frequency.

## 2024-03-15 NOTE — TELEPHONE ENCOUNTER
RN left detailed VM for patient regarding his MRI findings and Dr. Holloway recommendation to continue Ocrevus at the usual frequency. Encouraged patient to make an appointment with their PCP if they're still not feeling well. Writer sent patient a [facility] message with this information as well.    FAUZIA FordN RN Care Coordinator  Neurology/Neurosurgery/PM&R/Pain Management

## 2024-03-15 NOTE — TELEPHONE ENCOUNTER
Writer received faxed MRI results from [facility]. Placed in provider folder for review.  Copy sent to [facility] for scan.    FELICIA Dunn., LUZ ([facility])

## 2024-03-18 DIAGNOSIS — G35 MS (MULTIPLE SCLEROSIS) (H): ICD-10-CM

## 2024-03-18 DIAGNOSIS — R53.82 CHRONIC FATIGUE: ICD-10-CM

## 2024-03-18 RX ORDER — MODAFINIL 200 MG/1
TABLET ORAL
Qty: 60 TABLET | Refills: 5 | Status: SHIPPED | OUTPATIENT
Start: 2024-03-18

## 2024-03-18 NOTE — TELEPHONE ENCOUNTER
Pending Prescriptions:                       Disp   Refills    modafinil (PROVIGIL) 200 MG tablet        60 tab*5            Sig: Take one tablet by mouth in the morning; can           repeat in the early afternoon. Do not take after           2 pm.        Requested Pharmacy: [facility] in [place]    Pt's last office visit: 2/5/24  Next scheduled office visit: 8/19/24      Per the RN/LPN medication refill protocol, writer is unable to refill this request.

## 2024-05-28 DIAGNOSIS — R53.82 CHRONIC FATIGUE: ICD-10-CM

## 2024-05-28 DIAGNOSIS — G35 MS (MULTIPLE SCLEROSIS) (H): ICD-10-CM

## 2024-05-29 RX ORDER — MODAFINIL 200 MG/1
TABLET ORAL
Qty: 180 TABLET | OUTPATIENT
Start: 2024-05-29

## 2024-05-29 NOTE — TELEPHONE ENCOUNTER
RN called [facility] pharmacy in [place] to confirm that patient still has refills remaining. They will work on filling the prescription for patient. Will refuse refill request at this time.      ALISE Dempsey RN Care Coordinator  Neurology/Neurosurgery/PM&R/Pain Management

## 2024-11-06 DIAGNOSIS — G35 MS (MULTIPLE SCLEROSIS) (H): ICD-10-CM

## 2024-11-06 DIAGNOSIS — R53.82 CHRONIC FATIGUE: ICD-10-CM

## 2024-11-06 NOTE — CONFIDENTIAL NOTE
Rozina Macias to Me   SCOTT    11/6/24  1:46 PM  Debbi Pang,    1/20/2025 is the scheduled appointment. Thank you for your message!    Rozina

## 2024-11-06 NOTE — CONFIDENTIAL NOTE
Encounter routed to Dr Agarwal to review and sign the pending prescription.    Poly Vargas, RN Care Coordinator   Neurology/Neurosurgery/PM&R/ Pain Management

## 2024-11-06 NOTE — TELEPHONE ENCOUNTER
Medication: modafinil (PROVIGIL) 200 MG tablet   Sig: Take one tablet by mouth in the morning; can repeat in the early afternoon. Do not take after 2 pm.   Date last written: 3/18/24  Dispensed amount: 60  Refills: 5    [facility]ed [facility]    Pt's last office visit: 2/5/24  Next scheduled office visit: None, the pt will need to schedule a follow up before we can authorize a refill. Please call to schedule.        Per the RN/LPN medication refill protocol, writer is unable to refill this request.     Poly Vargas RN Care Coordinator   Neurology/Neurosurgery/PM&R/ Pain Management

## 2024-11-07 RX ORDER — MODAFINIL 200 MG/1
TABLET ORAL
Qty: 60 TABLET | Refills: 5 | Status: SHIPPED | OUTPATIENT
Start: 2024-11-07

## 2025-01-17 ENCOUNTER — TELEPHONE (OUTPATIENT)
Dept: NEUROLOGY | Facility: CLINIC | Age: 45
End: 2025-01-17

## 2025-01-17 NOTE — TELEPHONE ENCOUNTER
Health Call Center    Phone Message    May a detailed message be left on voicemail: yes     Reason for Call: Other:       Patient requesting appointment on 01/20/2025 be changed to virtual visit due to lack of transportation. Template would not allow writer to change to virtual. Sending TE to clinic to review patients request. Call back #[phone]       Writer sending as high priority as change request is for next business day.     Action Taken: Message routed to:  Other:  Neurology    Travel Screening: Not Applicable

## 2025-01-17 NOTE — TELEPHONE ENCOUNTER
Writer called patient and changed in clinic to virtual on 1/20/25.  Patient had no questions and patient ended the call.    FELICIA Dunn., LUZ ([facility])

## 2025-02-05 ENCOUNTER — TELEPHONE (OUTPATIENT)
Dept: NEUROSURGERY | Facility: CLINIC | Age: 45
End: 2025-02-05
Payer: COMMERCIAL

## 2025-02-05 NOTE — TELEPHONE ENCOUNTER
Writer received form from [facility] infusion pharmacy and placed forms in provider folder for review and signature.  EVELINE Dunn, LUZ ([facility])

## 2025-02-06 ENCOUNTER — TELEPHONE (OUTPATIENT)
Dept: NEUROLOGY | Facility: CLINIC | Age: 45
End: 2025-02-06
Payer: COMMERCIAL

## 2025-02-06 NOTE — CONFIDENTIAL NOTE
RN got a response back from the provider that he is cleared to resume his Ocrevus infusion. The infusion nurses at [facility] were informed.    Poly Vargas RN Care Coordinator   Neurology/Neurosurgery/PM&R/ Pain Management

## 2025-02-06 NOTE — CONFIDENTIAL NOTE
RN received a call from the infusion nurse at [facility] notifying us that Jovan had a reaction after his Ocrevus infusion was started today. She states that the pt complained of feeling like there was a lump in his throat. They stopped the infusion and gave him Benadryl and Pepcid. It has been a half an hour and he is back to baseline. She would like to know if he is able to finish his infusion. Their protocol states that they will return back to the previous infusion rate he was at when he wasn't having any issues and see how he does. She is unaware if this has been an issue in the past. He is in the chair now and they need confirmation from Dr Agarwal on whether or not they can proceed. Urgent message sent to the provider.     Poly Vargas RN Care Coordinator   Neurology/Neurosurgery/PM&R/ Pain Management

## 2025-04-19 ENCOUNTER — HEALTH MAINTENANCE LETTER (OUTPATIENT)
Age: 45
End: 2025-04-19

## 2025-05-10 DIAGNOSIS — G35 MS (MULTIPLE SCLEROSIS) (H): ICD-10-CM

## 2025-05-10 DIAGNOSIS — R53.82 CHRONIC FATIGUE: ICD-10-CM

## 2025-05-12 RX ORDER — MODAFINIL 200 MG/1
TABLET ORAL
Qty: 60 TABLET | Refills: 5 | Status: SHIPPED | OUTPATIENT
Start: 2025-05-12

## 2025-05-12 NOTE — TELEPHONE ENCOUNTER
Neuroscience Clinic Task Note    TASK    Neurology Rx Refill  Medication modafinil   Dose 200mg   Last refill ordered (m/d/y) 11/7/24   Last quantity ordered 60 tab   Last # refills 5   Last clinic visit with ordering provider (m/d/y) 1/20/25   Next clinic visit with ordering provider (m/d/y) 7/21/25   All pertinent protocol data (lab date/result)    Pertinent information from patient's message        FOLLOW-UP      ADDITIONAL COMMENTS      Rhoda Mcallister LPN

## 2025-06-24 ENCOUNTER — TELEPHONE (OUTPATIENT)
Dept: NEUROLOGY | Facility: CLINIC | Age: 45
End: 2025-06-24
Payer: COMMERCIAL

## 2025-06-24 NOTE — TELEPHONE ENCOUNTER
[facility] Call Center    Phone Message    May a detailed message be left on voicemail: yes     Reason for Call: Other:       Pt requesting referral for infusions be sent over to [facility] infusion center. Pt states he is due for an infusion, but needs that referral sent over.     Pt's call back # [phone], if any questions.    Action Taken: Message routed to:  [facility] ([facility]): Neurology/[facility]    Travel Screening: Not Applicable